# Patient Record
Sex: FEMALE | Race: WHITE | NOT HISPANIC OR LATINO | URBAN - METROPOLITAN AREA
[De-identification: names, ages, dates, MRNs, and addresses within clinical notes are randomized per-mention and may not be internally consistent; named-entity substitution may affect disease eponyms.]

---

## 2018-05-25 ENCOUNTER — TRANSFERRED RECORDS (OUTPATIENT)
Dept: HEALTH INFORMATION MANAGEMENT | Facility: CLINIC | Age: 83
End: 2018-05-25

## 2018-06-18 DIAGNOSIS — H40.1490 PXG (PSEUDOEXFOLIATION GLAUCOMA): Primary | ICD-10-CM

## 2018-06-19 ENCOUNTER — OFFICE VISIT (OUTPATIENT)
Dept: OPHTHALMOLOGY | Facility: CLINIC | Age: 83
End: 2018-06-19
Attending: OPHTHALMOLOGY
Payer: MEDICARE

## 2018-06-19 DIAGNOSIS — H40.1490 PXG (PSEUDOEXFOLIATION GLAUCOMA): ICD-10-CM

## 2018-06-19 DIAGNOSIS — H40.1493 PXG (PSEUDOEXFOLIATION GLAUCOMA), SEVERE STAGE: Primary | ICD-10-CM

## 2018-06-19 DIAGNOSIS — Z96.1 PSEUDOPHAKIA OF BOTH EYES: ICD-10-CM

## 2018-06-19 PROCEDURE — G0463 HOSPITAL OUTPT CLINIC VISIT: HCPCS | Mod: ZF

## 2018-06-19 PROCEDURE — 92083 EXTENDED VISUAL FIELD XM: CPT | Mod: ZF | Performed by: OPHTHALMOLOGY

## 2018-06-19 RX ORDER — LATANOPROST 50 UG/ML
1 SOLUTION/ DROPS OPHTHALMIC AT BEDTIME
Qty: 1 BOTTLE | Refills: 11 | Status: SHIPPED | OUTPATIENT
Start: 2018-06-19 | End: 2018-10-18

## 2018-06-19 RX ORDER — DORZOLAMIDE HYDROCHLORIDE AND TIMOLOL MALEATE 20; 5 MG/ML; MG/ML
1 SOLUTION/ DROPS OPHTHALMIC 2 TIMES DAILY
Qty: 1 BOTTLE | Refills: 11 | Status: SHIPPED | OUTPATIENT
Start: 2018-06-19 | End: 2018-06-21

## 2018-06-19 RX ORDER — ACYCLOVIR 800 MG/1
1 TABLET ORAL 3 TIMES DAILY
COMMUNITY
Start: 2017-10-10

## 2018-06-19 RX ORDER — PREGABALIN 75 MG/1
1 CAPSULE ORAL ONCE
COMMUNITY
Start: 2017-09-15

## 2018-06-19 RX ORDER — BRIMONIDINE TARTRATE 2 MG/ML
1 SOLUTION/ DROPS OPHTHALMIC 3 TIMES DAILY
Qty: 15 ML | Refills: 11 | Status: SHIPPED | OUTPATIENT
Start: 2018-06-19 | End: 2018-06-21

## 2018-06-19 RX ORDER — ERGOCALCIFEROL (VITAMIN D2) 10 MCG
1 TABLET ORAL ONCE
COMMUNITY
Start: 2007-02-26 | End: 2019-03-07

## 2018-06-19 ASSESSMENT — REFRACTION_WEARINGRX
OS_CYLINDER: +0.25
OS_AXIS: 010
OD_SPHERE: BALANCE
OS_SPHERE: -0.25

## 2018-06-19 ASSESSMENT — VISUAL ACUITY
METHOD: SNELLEN - LINEAR
OS_SC: 20/25
OS_SC+: -1
METHOD_MR: PT DECLINED MR TODAY
OD_SC: 20/400

## 2018-06-19 ASSESSMENT — CONF VISUAL FIELD
OD_SUPERIOR_TEMPORAL_RESTRICTION: 1
OS_NORMAL: 1
OD_INFERIOR_NASAL_RESTRICTION: 1
METHOD: COUNTING FINGERS
OD_INFERIOR_TEMPORAL_RESTRICTION: 1
OD_SUPERIOR_NASAL_RESTRICTION: 1

## 2018-06-19 ASSESSMENT — TONOMETRY
IOP_METHOD: APPLANATION
OS_IOP_MMHG: 34
OD_IOP_MMHG: 37

## 2018-06-19 ASSESSMENT — SLIT LAMP EXAM - LIDS
COMMENTS: NORMAL
COMMENTS: NORMAL

## 2018-06-19 ASSESSMENT — EXTERNAL EXAM - LEFT EYE: OS_EXAM: NORMAL

## 2018-06-19 NOTE — PROGRESS NOTES
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 An at [facility]  3)PCIOL OU -- following with Dr. Dudley at [facility]  4)H/O HZO OD with post-herpetic neuralgia -- on Acyclovir and Gabapentin  5)H/O Hemiretinal Vein Occlusion OD per old notes    MD:Drew Orlando (son-in-law) accompanies to most appointments     MD: IOP OS elevated at [facility] with minimal effect after starting Neptazane -- pt referred by Dr. Interiano for consideration of mCPC prior to tube surgery OS    Patient will continue on Latanoprost which is a teal top drop at bedtime in BOTH EYES, Cosopt (Timolol/Dorzolamide) which is a blue top drop 2x/day (12 hours apart) in BOTH EYES, and Alphagan (Brimonidine) which is a purple top drop 2x/day (12 hours apart) in BOTH EYES.  Patient will return to clinic in 2-3 weeks with repeat IOP check, dilated eye exam and OCT with RNFL analysis.    Patient will discontinue and hold onto previous eye drops and bring them with her to her next follow up appointment.    A long discussion of the risks, benefits, and alternatives including potential treatment and management options were had with patient and a decision was made to recheck eye pressures prior to proceeding with Micropusle CPC (laser) versus tube surgery.       Attending Physician Attestation:  Complete documentation of historical and exam elements from today's encounter can be found in the full encounter summary report (not  reduplicated in this progress note). I personally obtained the chief complaint(s) and history of present illness.  I confirmed and edited as necessary the review of systems, past medical/surgical history, family history, social history, and examination findings as documented by others; and I examined the patient myself. I personally reviewed the relevant tests, images, and reports as documented above. I formulated and edited as necessary the assessment and plan and discussed the findings and management plan with the patient and family.  - Radha Morales MD

## 2018-06-19 NOTE — MR AVS SNAPSHOT
After Visit Summary   6/19/2018    Kenia Edwards    MRN: [id number]           Patient Information     Date Of Birth          10/20/1931        Visit Information        Provider Department      6/19/2018 1:45 PM Radha Morales MD [facility]        Today's Diagnoses     Pxg (pseudoexfoliation glaucoma), severe stage    -  1    PXG (pseudoexfoliation glaucoma)        Pseudophakia of both eyes          Care Instructions    Patient will continue on Latanoprost which is a teal top drop at bedtime in BOTH EYES, Cosopt (Timolol/Dorzolamide) which is a blue top drop 2x/day (12 hours apart) in BOTH EYES, and Alphagan (Brimonidine) which is a purple top drop 2x/day (12 hours apart) in BOTH EYES.  Patient will return to clinic in 2-3 weeks with repeat IOP check, dilated eye exam and OCT with RNFL analysis.    Patient discontinue and hold onto previous eye drops and bring them with her to her next follow up appointment.    A long discussion of the risks, benefits, and alternatives including potential treatment and management options were had with patient and a decision was made to recheck eye pressures prior to proceeding with Micropusle CPC (laser) versus tube surgery.           Follow-ups after your visit        Follow-up notes from your care team     Return  2-3 weeks with repeat IOP check, dilated eye exam and OCT with RNFL analysis..      Your next 10 appointments already scheduled     Jul 12, 2018  7:45 AM CDT   RETURN GLAUCOMA with Radha Morales MD   Eye Clinic ([facility])    [address] [facility] [address] [place] [id number]   [phone]              Who to contact     Please call your clinic at [phone] to:    Ask questions about your health    Make or cancel appointments    Discuss your medicines    Learn about your test results    Speak to your doctor            Additional Information About Your Visit        MyChart Information      [employer] gives you secure access to your electronic health record. If you see a primary care provider, you can also send messages to your care team and make appointments. If you have questions, please call your primary care clinic.  If you do not have a primary care provider, please call [phone] and they will assist you.      [employer] is an electronic gateway that provides easy, online access to your medical records. With [employer], you can request a clinic appointment, read your test results, renew a prescription or communicate with your care team.     To access your existing account, please contact your [facility] or call [phone] for assistance.        Care EveryWhere ID     This is your Care EveryWhere ID. This could be used by other organizations to access your [place] medical records  [id number]         Blood Pressure from Last 3 Encounters:   No data found for BP    Weight from Last 3 Encounters:   No data found for Wt              We Performed the Following     OVF 24-2 Dynamic OS          Today's Medication Changes          These changes are accurate as of 6/19/18  4:49 PM.  If you have any questions, ask your nurse or doctor.               Start taking these medicines.        Dose/Directions    brimonidine 0.2 % ophthalmic solution   Commonly known as:  ALPHAGAN   Used for:  Pxg (pseudoexfoliation glaucoma), severe stage   Started by:  Radha Morales MD        Dose:  1 drop   Place 1 drop into both eyes 3 times daily   Quantity:  15 mL   Refills:  11       dorzolamide-timolol 2-0.5 % ophthalmic solution   Commonly known as:  COSOPT   Used for:  Pxg (pseudoexfoliation glaucoma), severe stage   Started by:  Radha Morales MD        Dose:  1 drop   Place 1 drop into both eyes 2 times daily   Quantity:  1 Bottle   Refills:  11         These medicines have changed or have updated prescriptions.        Dose/Directions    * latanoprost 0.005 % ophthalmic solution    Commonly known as:  XALATAN   This may have changed:  Another medication with the same name was added. Make sure you understand how and when to take each.   Used for:  Glaucoma suspect, bilateral   Changed by:  Radha Morales MD        Dose:  1 drop   1 drop At Bedtime   Refills:  0       * latanoprost 0.005 % ophthalmic solution   Commonly known as:  XALATAN   This may have changed:  You were already taking a medication with the same name, and this prescription was added. Make sure you understand how and when to take each.   Used for:  Pxg (pseudoexfoliation glaucoma), severe stage   Changed by:  Radha Morales MD        Dose:  1 drop   Place 1 drop into both eyes At Bedtime   Quantity:  1 Bottle   Refills:  11       * Notice:  This list has 2 medication(s) that are the same as other medications prescribed for you. Read the directions carefully, and ask your doctor or other care provider to review them with you.         Where to get your medicines      Some of these will need a paper prescription and others can be bought over the counter.  Ask your nurse if you have questions.     Bring a paper prescription for each of these medications     brimonidine 0.2 % ophthalmic solution    dorzolamide-timolol 2-0.5 % ophthalmic solution    latanoprost 0.005 % ophthalmic solution                Primary Care Provider Office Phone # Fax #    Richa Dubois [phone]       [facility] [place] [address] [place] [address]        Equal Access to Services     JANET KEARNS AH: Hadii monse ku hadasho Soerinali, waaxda luqadaha, qaybta kaalmada adeegyada, nichole ma. So [facility] [phone].    ATENCIÓN: Si rala jaylinañol, tiene a hodges disposición servicios gratuitos de asistencia lingüística. Llame al [phone].    We comply with applicable federal civil rights laws and [place] laws. We do not discriminate on the basis of race, color,  national origin, age, disability, sex, sexual orientation, or gender identity.            Thank you!     Thank you for choosing EYE CLINIC  for your care. Our goal is always to provide you with excellent care. Hearing back from our patients is one way we can continue to improve our services. Please take a few minutes to complete the written survey that you may receive in the mail after your visit with us. Thank you!             Your Updated Medication List - Protect others around you: Learn how to safely use, store and throw away your medicines at www.disposemymeds.org.          This list is accurate as of 6/19/18  4:49 PM.  Always use your most recent med list.                   Brand Name Dispense Instructions for use Diagnosis    acyclovir 800 MG tablet    ZOVIRAX     Take 1 tablet by mouth 4 times daily        albuterol 108 (90 Base) MCG/ACT Inhaler    PROAIR HFA/PROVENTIL HFA/VENTOLIN HFA     Inhale 2 puffs into the lungs daily    Glaucoma suspect, bilateral       ascorbic acid 500 MG Tabs           brimonidine 0.2 % ophthalmic solution    ALPHAGAN    15 mL    Place 1 drop into both eyes 3 times daily    Pxg (pseudoexfoliation glaucoma), severe stage       dorzolamide-timolol 2-0.5 % ophthalmic solution    COSOPT    1 Bottle    Place 1 drop into both eyes 2 times daily    Pxg (pseudoexfoliation glaucoma), severe stage       * Ergocalciferol 400 units Tabs           * Vitamin D2 400 units Tabs      Take 1 tablet by mouth once        * latanoprost 0.005 % ophthalmic solution    XALATAN     1 drop At Bedtime    Glaucoma suspect, bilateral       * latanoprost 0.005 % ophthalmic solution    XALATAN    1 Bottle    Place 1 drop into both eyes At Bedtime    Pxg (pseudoexfoliation glaucoma), severe stage       Latanoprostene Bunod 0.024 % Soln      Place 1 drop into both eyes At Bedtime        * PREGABALIN PO      Take 1 capsule by mouth 2 times daily    Glaucoma suspect, bilateral       * LYRICA 75 MG capsule   Generic  drug:  pregabalin      Take 1 tablet by mouth once        timolol 0.5 % ophthalmic solution    TIMOPTIC     1 drop every morning    Glaucoma suspect, bilateral       * Notice:  This list has 6 medication(s) that are the same as other medications prescribed for you. Read the directions carefully, and ask your doctor or other care provider to review them with you.

## 2018-06-19 NOTE — NURSING NOTE
Chief Complaints and History of Present Illnesses   Patient presents with     Consult For     CPC laser LE, referred by Dr. Interiano at [facility]    Affected eye(s):  Both   Symptoms:           Do you have eye pain now?:  No      Comments:  RE vision about the same no changes. Pt states LE vision is ok, but IOP high x 4 weeks. Per Dr. Interiano have pt do CPC laser to lower IOP in LE. Always pain in RE x years.     Grace Prakash@ [facility] 2:01 PM June 19, 2018

## 2018-06-19 NOTE — PATIENT INSTRUCTIONS
Patient will continue on Latanoprost which is a teal top drop at bedtime in BOTH EYES, Cosopt (Timolol/Dorzolamide) which is a blue top drop 2x/day (12 hours apart) in BOTH EYES, and Alphagan (Brimonidine) which is a purple top drop 2x/day (12 hours apart) in BOTH EYES.  Patient will return to clinic in 2-3 weeks with repeat IOP check, dilated eye exam and OCT with RNFL analysis.    Patient discontinue and hold onto previous eye drops and bring them with her to her next follow up appointment.    A long discussion of the risks, benefits, and alternatives including potential treatment and management options were had with patient and a decision was made to recheck eye pressures prior to proceeding with Micropusle CPC (laser) versus tube surgery.

## 2018-06-20 ENCOUNTER — TELEPHONE (OUTPATIENT)
Dept: OPHTHALMOLOGY | Facility: CLINIC | Age: 83
End: 2018-06-20

## 2018-06-20 NOTE — TELEPHONE ENCOUNTER
Health Call Center    Phone Message    May a detailed message be left on voicemail: yes    Reason for Call: Other: The pt's daughter Jony Orlando is calling to discuss the allergies the pt has to 2 of the RX prescribed by Dr Carmen osuna. Jony spoke to the pt who doesn't remember any emergency type reactions to these meds. The pt is allergic to Sulfa, can't take it orally, but has had drops in the past with Sulfa. The pt is allergic to Alphagan - causes itching. Please call Jony to discuss if there are alternatives that will be prescribed. Thanks!      Action Taken: Message routed to:  [facility] ben eye gen

## 2018-06-20 NOTE — TELEPHONE ENCOUNTER
[facility] Call Center    Phone Message    May a detailed message be left on voicemail: no    Reason for Call: Other: Candida, from [facility] called, and stated that 2 prescriptions sent over by Dr. Morales were flagged for pt having allergies to them. Pt has allergies to Sulfa (Cosopt) (causes anaphalaxis) and Brimonidine (Alphagan) (causes rash, and itching) Candida stated this allergy was listed w/ them @ [facility], and that she spoke w/ the pt who stated she had told us, and wanted to know why we did not have this information already. Candida is requesting we contact the pt to update her medication allergies at [phone], and to discuss what should be done next.     Action Taken: Message routed to:  [facility] Eye

## 2018-06-20 NOTE — TELEPHONE ENCOUNTER
Reviewed with dr. Rosas and called pt back this afternoon    Pt states been using timolol by itself-- not timolol/dorzolamide    Sulfa allergy    Reviewed with dr. Rosas  Will keep drops the same timolol and latanoprost    Pt has appt July 12 th for IOP check, will review drops at appt   Yaron Adams RN 5:02 PM 06/20/18

## 2018-06-20 NOTE — TELEPHONE ENCOUNTER
Spoke to pt this AM   Pt has been using dorzolamide/timolol for long period with no issues    Previous reaction to alphagan in past per pt and has not been using    Pt using latanoprost (pt states started sample of new eye drop as alternative) but no reaction to latanoprost    Please review note-- pharmacy concerns of reactions  Yaron Adams RN 4:21 PM 06/20/18

## 2018-06-21 NOTE — TELEPHONE ENCOUNTER
Reviewed with pharmacy and pharmacy aware will only use timolol and latanoprost for now  Will review further next visit    Updated meds in epic    Will need to reorder if would like to proceed with new drops  Yaron Adams RN 9:12 AM 06/21/18

## 2018-07-12 ENCOUNTER — OFFICE VISIT (OUTPATIENT)
Dept: OPHTHALMOLOGY | Facility: CLINIC | Age: 83
End: 2018-07-12
Attending: OPHTHALMOLOGY
Payer: MEDICARE

## 2018-07-12 DIAGNOSIS — Z96.1 PSEUDOPHAKIA OF BOTH EYES: ICD-10-CM

## 2018-07-12 DIAGNOSIS — H40.1493 PXG (PSEUDOEXFOLIATION GLAUCOMA), SEVERE STAGE: Primary | ICD-10-CM

## 2018-07-12 PROCEDURE — G0463 HOSPITAL OUTPT CLINIC VISIT: HCPCS | Mod: ZF

## 2018-07-12 ASSESSMENT — TONOMETRY
IOP_METHOD: TONOPEN
IOP_METHOD: APPLANATION
OS_IOP_MMHG: 36
OS_IOP_MMHG: 42
OD_IOP_MMHG: 26
IOP_METHOD: APPLANATION
OS_IOP_MMHG: 41
OD_IOP_MMHG: 23
OS_IOP_MMHG: 26
IOP_METHOD: APPLANATION
OD_IOP_MMHG: 24

## 2018-07-12 ASSESSMENT — CONF VISUAL FIELD
OD_INFERIOR_TEMPORAL_RESTRICTION: 3
OD_INFERIOR_NASAL_RESTRICTION: 3
OD_SUPERIOR_NASAL_RESTRICTION: 1
OS_NORMAL: 1

## 2018-07-12 ASSESSMENT — VISUAL ACUITY
OS_SC: 20/25
OD_SC: E @ 3'
METHOD: SNELLEN - LINEAR

## 2018-07-12 ASSESSMENT — CUP TO DISC RATIO
OS_RATIO: 0.8
OD_RATIO: 0.9

## 2018-07-12 ASSESSMENT — EXTERNAL EXAM - LEFT EYE: OS_EXAM: NORMAL

## 2018-07-12 ASSESSMENT — SLIT LAMP EXAM - LIDS
COMMENTS: NORMAL
COMMENTS: NORMAL

## 2018-07-12 NOTE — NURSING NOTE
Chief Complaints and History of Present Illnesses   Patient presents with     Follow Up For     Pxg (pseudoexfoliation glaucoma), severe stage (Primary Dx);...     HPI    Affected eye(s):  Both   Symptoms:     No blurred vision   No decreased vision   No floaters   No flashes   Dryness   Photophobia      Duration:  3 weeks   Frequency:  Constant       Do you have eye pain now?:  No      Comments:   States va is the same since last visit    Latanoprost BOTH EYES last used 8:00 PM  Timolol OU last used 5:00 AM  AT's 2 X daily  Gel nightly OU  Tom Cox  7:49 AM July 12, 2018

## 2018-07-12 NOTE — MR AVS SNAPSHOT
After Visit Summary   7/12/2018    Kenia Edwards    MRN: [id number]           Patient Information     Date Of Birth          10/20/1931        Visit Information        Provider Department      7/12/2018 7:45 AM Radha Morales MD [facility]        Today's Diagnoses     Pxg (pseudoexfoliation glaucoma), severe stage    -  1    Pseudophakia of both eyes          Care Instructions    Patient will continue on Latanoprost which is a teal top drop at bedtime in BOTH EYES, Timolol which is a yellow top drop in the morning in BOTH EYES.  A long discussion of the risks, benefits, and alternatives including potential treatment and management options were had with patient and a decision was made to retrial drops prior to proceeding with mCPC (laser) in the right eye and tube surgery in the left eye.     Patient will call if interested in trying a less invasive surgery prior to tube surgery in the left eye.            Follow-ups after your visit        Follow-up notes from your care team     Return in about 1 month (around 8/12/2018).      Who to contact     Please call your clinic at [phone] to:    Ask questions about your health    Make or cancel appointments    Discuss your medicines    Learn about your test results    Speak to your doctor            Additional Information About Your Visit        [employer] Information     [employer] gives you secure access to your electronic health record. If you see a primary care provider, you can also send messages to your care team and make appointments. If you have questions, please call your primary care clinic.  If you do not have a primary care provider, please call [phone] and they will assist you.      [employer] is an electronic gateway that provides easy, online access to your medical records. With [employer], you can request a clinic appointment, read your test results, renew a prescription or communicate with your care team.     To access your existing  account, please contact your [facility] or call [phone] for assistance.        Care EveryWhere ID     This is your Care EveryWhere ID. This could be used by other organizations to access your [place] medical records  [id number]         Blood Pressure from Last 3 Encounters:   No data found for BP    Weight from Last 3 Encounters:   No data found for Wt              Today, you had the following     No orders found for display       Primary Care Provider Office Phone # Fax #    Richacarlie Dubois [phone]       [facility] [place] [address] [place] [address]        Equal Access to Services     ANTONIETTA KEARNS : Hadii aad ku hadasho Soomaali, waaxda luqadaha, qaybta kaalmada adeegyada, waxay idiin hayaan adeeg sharon juarez . So [facility] [phone].    ATENCIÓN: Si habla español, tiene a hodges disposición servicios gratuitos de asistencia lingüística. [facility] [phone].    We comply with applicable federal civil rights laws and [place] laws. We do not discriminate on the basis of race, color, national origin, age, disability, sex, sexual orientation, or gender identity.            Thank you!     Thank you for choosing [facility]  for your care. Our goal is always to provide you with excellent care. Hearing back from our patients is one way we can continue to improve our services. Please take a few minutes to complete the written survey that you may receive in the mail after your visit with us. Thank you!             Your Updated Medication List - Protect others around you: Learn how to safely use, store and throw away your medicines at www.disposemymeds.org.          This list is accurate as of 7/12/18 10:23 AM.  Always use your most recent med list.                   Brand Name Dispense Instructions for use Diagnosis    acyclovir 800 MG tablet    ZOVIRAX     Take 1 tablet by mouth 4 times daily        albuterol 108 (90 Base)  MCG/ACT Inhaler    PROAIR HFA/PROVENTIL HFA/VENTOLIN HFA     Inhale 2 puffs into the lungs daily    Glaucoma suspect, bilateral       ascorbic acid 500 MG Tabs           * Ergocalciferol 400 units Tabs           * Vitamin D2 400 units Tabs      Take 1 tablet by mouth once        latanoprost 0.005 % ophthalmic solution    XALATAN    1 Bottle    Place 1 drop into both eyes At Bedtime    Pxg (pseudoexfoliation glaucoma), severe stage       * PREGABALIN PO      Take 1 capsule by mouth 2 times daily    Glaucoma suspect, bilateral       * LYRICA 75 MG capsule   Generic drug:  pregabalin      Take 1 tablet by mouth once        timolol 0.5 % ophthalmic solution    TIMOPTIC     1 drop every morning    Glaucoma suspect, bilateral       * Notice:  This list has 4 medication(s) that are the same as other medications prescribed for you. Read the directions carefully, and ask your doctor or other care provider to review them with you.

## 2018-07-12 NOTE — PATIENT INSTRUCTIONS
Patient will continue on Latanoprost which is a teal top drop at bedtime in BOTH EYES, Timolol which is a yellow top drop in the morning in BOTH EYES.  A long discussion of the risks, benefits, and alternatives including potential treatment and management options were had with patient and a decision was made to retrial drops prior to proceeding with mCPC (laser) in the right eye and tube surgery in the left eye.     Patient will call if interested in trying a less invasive surgery prior to tube surgery in the left eye.

## 2018-07-12 NOTE — PROGRESS NOTES
1)PXG -- s/p TSCPC OD (9/2/16), s/p Tube OD x2 in past with removal in 2016, ?Trab OU given exam findings --  K pachy:566/583    Tmax: 38/27 (OS:42 on 7/18)   HVF: OD:dec MD with :Inf arcuate with SN dec  (poor reliability) and OS:Fluct    CDR:0.9/0.8     HRT/OCT: OD:Mild RNFl thinning (poor tracing) and OS:Mod RNFl thinning       FHX of Glc: No     Gonio:       Intolerant to: Methazolamide -- loss weight, decreased appetite, AGN -- redness and itching (per notes from 10+ yrs ago), Cosopt -- redness and itching per old notes from 10+ years ago (likely MITZY as she is tolerating T1/2)    Asthma/COPD: Yes, on inhalers but tolerating topical BB  Steroid Use: Yes, was on topical pred     Kidney Stones:  No   Sulfa Allergy: Yes, hives     IOP targets: -- IOP above target OU -- consider mCPC vs Tube   2)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 An at [facility]  3)PCIOL OU -- following with Dr. Dudley and Dr. Interiano at [facility]  4)H/O HZO OD with post-herpetic neuralgia -- on Acyclovir and Gabapentin  5)H/O Hemiretinal Vein Occlusion OD per old notes  6)H/O K Ulcer OD per old notes -- followed by Dr. Gege ESCOBAR:Drew Nugentr (son-in-law) accompanies to most appointments -- Jony Orlando (daughter is RN)    MD: pt referred by Dr. Interiano for consideration of mCPC prior to tube surgery OS -- discussed possible options including Rhopressa, GATT/KDB, XEN, mCPC, Tube     MD: Patient never tried Cosopt or AGN given a reported H/O allergy from 10+ years ago    Patient will continue on Latanoprost which is a teal top drop at bedtime in BOTH EYES, Timolol which is a yellow top drop in the morning in BOTH EYES.  A long discussion of the risks, benefits, and alternatives including potential treatment and management options were had with patient and a decision was made to retrial drops prior to proceeding with mCPC (laser) in the right eye and tube surgery in the left eye.      Patient will call if interested in trying a less invasive surgery prior to tube surgery in the left eye.          Resident Note:  Here for IOP check  On Latanoprost at bedtime, on Timolol in the AM (allergic to Dorzolamide), also allergic to Alphagan  IOP today 24/36  Given intolerance to drops and elevated pressure, patient could benefit from mCPC or tube surgery    Chip Mendez MD  PGY-3 Ophthalmology Resident  [phone]    I spent a total of 25 minutes with the patient of which greater than 50% of this time was spent counseling treatment and management decisions.        Attending Physician Attestation:  Complete documentation of historical and exam elements from today's encounter can be found in the full encounter summary report (not reduplicated in this progress note). I personally obtained the chief complaint(s) and history of present illness.  I confirmed and edited as necessary the review of systems, past medical/surgical history, family history, social history, and examination findings as documented by others; and I examined the patient myself. I personally reviewed the relevant tests, images, and reports as documented above. I formulated and edited as necessary the assessment and plan and discussed the findings and management plan with the patient and family.  - Radha Morales MD

## 2018-08-09 ENCOUNTER — OFFICE VISIT (OUTPATIENT)
Dept: OPHTHALMOLOGY | Facility: CLINIC | Age: 83
End: 2018-08-09
Attending: OPHTHALMOLOGY
Payer: MEDICARE

## 2018-08-09 DIAGNOSIS — H40.1493 PXG (PSEUDOEXFOLIATION GLAUCOMA), SEVERE STAGE: Primary | ICD-10-CM

## 2018-08-09 DIAGNOSIS — Z96.1 PSEUDOPHAKIA OF BOTH EYES: ICD-10-CM

## 2018-08-09 PROCEDURE — G0463 HOSPITAL OUTPT CLINIC VISIT: HCPCS | Mod: ZF

## 2018-08-09 RX ORDER — DORZOLAMIDE HYDROCHLORIDE AND TIMOLOL MALEATE 20; 5 MG/ML; MG/ML
SOLUTION/ DROPS OPHTHALMIC
COMMUNITY
Start: 2018-07-12 | End: 2018-10-18

## 2018-08-09 RX ORDER — BRIMONIDINE TARTRATE 2 MG/ML
SOLUTION/ DROPS OPHTHALMIC
COMMUNITY
Start: 2018-07-12 | End: 2018-10-18

## 2018-08-09 ASSESSMENT — TONOMETRY
OS_IOP_MMHG: 28
OD_IOP_MMHG: 20
IOP_METHOD: APPLANATION

## 2018-08-09 ASSESSMENT — VISUAL ACUITY
OD_SC: CF @ 3'
OS_SC: 20/25
OS_SC+: -1
METHOD: SNELLEN - LINEAR

## 2018-08-09 ASSESSMENT — EXTERNAL EXAM - LEFT EYE: OS_EXAM: NORMAL

## 2018-08-09 ASSESSMENT — CONF VISUAL FIELD
OD_INFERIOR_NASAL_RESTRICTION: 1
OD_SUPERIOR_TEMPORAL_RESTRICTION: 1
OS_NORMAL: 1
OD_SUPERIOR_NASAL_RESTRICTION: 1
OD_INFERIOR_TEMPORAL_RESTRICTION: 1
METHOD: COUNTING FINGERS

## 2018-08-09 ASSESSMENT — SLIT LAMP EXAM - LIDS
COMMENTS: NORMAL
COMMENTS: NORMAL

## 2018-08-09 NOTE — PROGRESS NOTES
1)PXG -- s/p TSCPC OD (9/2/16), s/p Tube OD x2 in past with removal in 2016, ?Trab OU given exam findings --  K pachy:566/583    Tmax: 38/27 (OS:42 on 7/18)   HVF: OD:dec MD with :Inf arcuate with SN dec  (poor reliability) and OS:Fluct    CDR:0.9/0.8     HRT/OCT: OD:Mild RNFl thinning (poor tracing) and OS:Mod RNFl thinning       FHX of Glc: No     Gonio:       Intolerant to: Methazolamide -- loss weight, decreased appetite, AGN -- redness and itching (per notes from 10+ yrs ago), Cosopt -- redness and itching per old notes from 10+ years ago (likely MITZY as she is tolerating T1/2)    Asthma/COPD: Yes, on inhalers but tolerating topical BB  Steroid Use: Yes, was on topical pred     Kidney Stones:  No   Sulfa Allergy: Yes, hives     IOP targets: -- IOP above target OS -- consider mCPC vs Tube   2)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 An at [facility]  3)PCIOL OU -- following with Dr. Dudley and Dr. Interiano at [facility]  4)H/O HZO OD with post-herpetic neuralgia -- on Acyclovir and Gabapentin  5)H/O Hemiretinal Vein Occlusion OD per old notes  6)H/O K Ulcer OD per old notes -- followed by Dr. Gege ESCOBAR:Drew Nugentr (son-in-law) accompanies to most appointments -- Jony Orlando (daughter is RN)    MD: pt referred by Dr. Interiano for consideration of mCPC prior to tube surgery OS -- discussed possible options including Rhopressa, GATT/KDB, XEN, mCPC, Tube     MD: Patient never tried Cosopt or AGN given a reported H/O allergy from 10+ years ago -- retried (8/18) without any SE or issues per patient     Patient will continue on Latanoprost which is a teal top drop at bedtime in BOTH EYES, Cosopt (Timolol/Dorzolamide) which is a blue top drop 2x/day (12 hours apart) in BOTH EYES, Alphagan (Brimonidine) which is a purple top drop 2x/day (12 hours apart) in BOTH EYES.      A long discussion of the risks, benefits, and alternatives including potential  treatment and management options were had with patient and a decision was made to consider proceeding with mCPC (laser) or tube surgery in the left eye.     Patient will call if interested in trying a less invasive surgery prior to tube surgery in the left eye.          Attending Physician Attestation:  Complete documentation of historical and exam elements from today's encounter can be found in the full encounter summary report (not reduplicated in this progress note). I personally obtained the chief complaint(s) and history of present illness.  I confirmed and edited as necessary the review of systems, past medical/surgical history, family history, social history, and examination findings as documented by others; and I examined the patient myself. I personally reviewed the relevant tests, images, and reports as documented above. I formulated and edited as necessary the assessment and plan and discussed the findings and management plan with the patient and family.  - Radha Morales MD

## 2018-08-09 NOTE — PATIENT INSTRUCTIONS
Patient will continue on Latanoprost which is a teal top drop at bedtime in BOTH EYES, Cosopt (Timolol/Dorzolamide) which is a blue top drop 2x/day (12 hours apart) in BOTH EYES, Alphagan (Brimonidine) which is a purple top drop 2x/day (12 hours apart) in BOTH EYES.      A long discussion of the risks, benefits, and alternatives including potential treatment and management options were had with patient and a decision was made to consider proceeding with mCPC (laser) or tube surgery in the left eye.

## 2018-08-09 NOTE — MR AVS SNAPSHOT
After Visit Summary   8/9/2018    Kenia Edwards    MRN: [id number]           Patient Information     Date Of Birth          10/20/1931        Visit Information        Provider Department      8/9/2018 1:00 PM Radha Morales MD [facility]        Today's Diagnoses     Pxg (pseudoexfoliation glaucoma), severe stage    -  1    Pseudophakia of both eyes          Care Instructions    Patient will continue on Latanoprost which is a teal top drop at bedtime in BOTH EYES, Cosopt (Timolol/Dorzolamide) which is a blue top drop 2x/day (12 hours apart) in BOTH EYES, Alphagan (Brimonidine) which is a purple top drop 2x/day (12 hours apart) in BOTH EYES.      A long discussion of the risks, benefits, and alternatives including potential treatment and management options were had with patient and a decision was made to consider proceeding with mCPC (laser) or tube surgery in the left eye.             Follow-ups after your visit        Your next 10 appointments already scheduled     Sep 25, 2018 12:45 PM CDT   Post-Op with Radha Morales MD   Eye Clinic ([facility])    [address] [id number] [phone]              Who to contact     Please call your clinic at [phone] to:    Ask questions about your health    Make or cancel appointments    Discuss your medicines    Learn about your test results    Speak to your doctor            Additional Information About Your Visit        MyChart Information     [employer] gives you secure access to your electronic health record. If you see a primary care provider, you can also send messages to your care team and make appointments. If you have questions, please call your primary care clinic.  If you do not have a primary care provider, please call [phone] and they will assist you.      [employer] is an electronic gateway that provides easy, online access to your medical records. With  [employer], you can request a clinic appointment, read your test results, renew a prescription or communicate with your care team.     To access your existing account, please contact your [facility] or call [phone] for assistance.        Care EveryWhere ID     This is your Care EveryWhere ID. This could be used by other organizations to access your [place] medical records  [id number]         Blood Pressure from Last 3 Encounters:   No data found for BP    Weight from Last 3 Encounters:   No data found for Wt              We Performed the Following     Sissy-Operative Worksheet (Glaucoma)        Primary Care Provider Office Phone # Fax #    Richa Dubois [phone]       [address] [address] [address] [place] [address] [place] [address]        Equal Access to Services     JANET KEARNS : Hadii monse ku hadasho Soomaali, waaxda luqadaha, qaybta kaalmada adeegyada, waxay warrenin hayaan dunia juarez . So [facility] [phone].    ATENCIÓN: Si habla español, tiene a hodges disposición servicios gratuitos de asistencia lingüística. Llame al [phone].    We comply with applicable federal civil rights laws and [place] laws. We do not discriminate on the basis of race, color, national origin, age, disability, sex, sexual orientation, or gender identity.            Thank you!     Thank you for choosing EYE CLINIC  for your care. Our goal is always to provide you with excellent care. Hearing back from our patients is one way we can continue to improve our services. Please take a few minutes to complete the written survey that you may receive in the mail after your visit with us. Thank you!             Your Updated Medication List - Protect others around you: Learn how to safely use, store and throw away your medicines at www.disposemymeds.org.          This list is accurate as of 8/9/18 11:59 PM.  Always use your most recent med list.                    Brand Name Dispense Instructions for use Diagnosis    acyclovir 800 MG tablet    ZOVIRAX     Take 1 tablet by mouth 4 times daily        albuterol 108 (90 Base) MCG/ACT inhaler    PROAIR HFA/PROVENTIL HFA/VENTOLIN HFA     Inhale 2 puffs into the lungs daily    Glaucoma suspect, bilateral       ascorbic acid 500 MG Tabs           brimonidine 0.2 % ophthalmic solution    ALPHAGAN          dorzolamide-timolol 2-0.5 % ophthalmic solution    COSOPT          * Ergocalciferol 400 units Tabs           * Vitamin D2 400 units Tabs      Take 1 tablet by mouth once        latanoprost 0.005 % ophthalmic solution    XALATAN    1 Bottle    Place 1 drop into both eyes At Bedtime    Pxg (pseudoexfoliation glaucoma), severe stage       * PREGABALIN PO      Take 1 capsule by mouth 2 times daily    Glaucoma suspect, bilateral       * LYRICA 75 MG capsule   Generic drug:  pregabalin      Take 1 tablet by mouth once        REFRESH CELLUVISC OP           REFRESH P.M. OP           * Notice:  This list has 4 medication(s) that are the same as other medications prescribed for you. Read the directions carefully, and ask your doctor or other care provider to review them with you.

## 2018-08-09 NOTE — NURSING NOTE
Chief Complaints and History of Present Illnesses   Patient presents with     Glaucoma Follow Up     1 month f/u for PXG      HPI    Affected eye(s):  Both   Symptoms:           Do you have eye pain now?:  No      Comments:  Pt states vision is stable no big changes since last visit. Pt also notes drops are going well and she is tolerating them well.     Grace Prakash@ [facility] 1:03 PM August 9, 2018

## 2018-08-27 ENCOUNTER — TRANSFERRED RECORDS (OUTPATIENT)
Dept: HEALTH INFORMATION MANAGEMENT | Facility: CLINIC | Age: 83
End: 2018-08-27

## 2018-09-25 ENCOUNTER — OFFICE VISIT (OUTPATIENT)
Dept: OPHTHALMOLOGY | Facility: CLINIC | Age: 83
End: 2018-09-25
Attending: OPHTHALMOLOGY
Payer: MEDICARE

## 2018-09-25 DIAGNOSIS — H40.1493 PXG (PSEUDOEXFOLIATION GLAUCOMA), SEVERE STAGE: Primary | ICD-10-CM

## 2018-09-25 PROCEDURE — G0463 HOSPITAL OUTPT CLINIC VISIT: HCPCS | Mod: ZF

## 2018-09-25 ASSESSMENT — CONF VISUAL FIELD
OD_INFERIOR_TEMPORAL_RESTRICTION: 1
OD_SUPERIOR_NASAL_RESTRICTION: 1
OD_SUPERIOR_TEMPORAL_RESTRICTION: 1
OD_INFERIOR_NASAL_RESTRICTION: 1

## 2018-09-25 ASSESSMENT — TONOMETRY
IOP_METHOD: TONOPEN
OD_IOP_MMHG: 18
OD_IOP_MMHG: 20
OS_IOP_MMHG: 32
OS_IOP_MMHG: 22
OD_IOP_MMHG: 22
OS_IOP_MMHG: 30
IOP_METHOD: APPLANATION
IOP_METHOD: APPLANATION

## 2018-09-25 ASSESSMENT — SLIT LAMP EXAM - LIDS
COMMENTS: NORMAL
COMMENTS: NORMAL

## 2018-09-25 ASSESSMENT — EXTERNAL EXAM - LEFT EYE: OS_EXAM: NORMAL

## 2018-09-25 ASSESSMENT — VISUAL ACUITY
OD_SC: 20/400
METHOD: SNELLEN - LINEAR
OS_SC: 20/30

## 2018-09-25 NOTE — MR AVS SNAPSHOT
After Visit Summary   9/25/2018    Kenia Edwards    MRN: [id number]           Patient Information     Date Of Birth          10/20/1931        Visit Information        Provider Department      9/25/2018 12:45 PM Radha Morales MD [facility]        Today's Diagnoses     Pxg (pseudoexfoliation glaucoma), severe stage    -  1      Care Instructions    Patient will continue on Latanoprost which is a teal top drop at bedtime in BOTH EYES, Cosopt (Timolol/Dorzolamide) which is a blue top drop 2x/day (12 hours apart) in BOTH EYES, Alphagan (Brimonidine) which is a purple top drop 2x/day (12 hours apart) in BOTH EYES.      A long discussion of the risks, benefits, and alternatives including potential treatment and management options were had with patient and a decision was made to check IOP prior to considering additional mCPC (laser) or tube surgery in the left eye.     Patient will return to clinic in 2-4 weeks with repeat IOP check.            Follow-ups after your visit        Follow-up notes from your care team     Return 2-4 weeks with repeat IOP check..      Your next 10 appointments already scheduled     Oct 18, 2018 12:15 PM CDT   RETURN GLAUCOMA with Radha Morales MD   Eye Clinic ([facility])    [place] [address]   [facility] [address] [place] [id number]   [phone]            Oct 23, 2018  1:30 PM CDT   Post-Op with Radha Morales MD   Eye Clinic ([facility])    [place] [address]   [facility] [address] [place] [address]   [phone]            Nov 01, 2018  1:30 PM CDT   Post-Op with Radha Morales MD   Eye Clinic ([facility])    [place] [address]   [facility] [address] [place] [address]   [phone]              Who to contact     Please call your clinic at [phone] to:    Ask questions about your health    Make or cancel appointments    Discuss your  medicines    Learn about your test results    Speak to your doctor            Additional Information About Your Visit        MyChart Information     [employer] gives you secure access to your electronic health record. If you see a primary care provider, you can also send messages to your care team and make appointments. If you have questions, please call your primary care clinic.  If you do not have a primary care provider, please call [phone] and they will assist you.      [employer] is an electronic gateway that provides easy, online access to your medical records. With [employer], you can request a clinic appointment, read your test results, renew a prescription or communicate with your care team.     To access your existing account, please contact your [facility] or call [phone] for assistance.        Care EveryWhere ID     This is your Care EveryWhere ID. This could be used by other organizations to access your [place] medical records  [id number]         Blood Pressure from Last 3 Encounters:   No data found for BP    Weight from Last 3 Encounters:   No data found for Wt              We Performed the Following     Sissy-Operative Worksheet (Glaucoma)        Primary Care Provider Office Phone # Fax #    Richa Dubois [phone]       [facility] [place] [address] [place] [address]        Equal Access to Services     JANET KEARNS : Hadii aad ku hadasho Soomaali, waaxda luqadaha, qaybta kaalmada adeegyada, waxay idiin hayalexein dunia kharaoscar la'cristiano ma. So [facility] [phone].    ATENCIÓN: Si habla español, tiene a hodges disposición servicios gratuitos de asistencia lingüística. Llame al [phone].    We comply with applicable federal civil rights laws and [place] laws. We do not discriminate on the basis of race, color, national origin, age, disability, sex, sexual orientation, or gender identity.            Thank you!     Thank  you for choosing EYE CLINIC  for your care. Our goal is always to provide you with excellent care. Hearing back from our patients is one way we can continue to improve our services. Please take a few minutes to complete the written survey that you may receive in the mail after your visit with us. Thank you!             Your Updated Medication List - Protect others around you: Learn how to safely use, store and throw away your medicines at www.disposemymeds.org.          This list is accurate as of 9/25/18  2:26 PM.  Always use your most recent med list.                   Brand Name Dispense Instructions for use Diagnosis    acyclovir 800 MG tablet    ZOVIRAX     Take 1 tablet by mouth 4 times daily        albuterol 108 (90 Base) MCG/ACT inhaler    PROAIR HFA/PROVENTIL HFA/VENTOLIN HFA     Inhale 2 puffs into the lungs daily    Glaucoma suspect, bilateral       ascorbic acid 500 MG Tabs           brimonidine 0.2 % ophthalmic solution    ALPHAGAN          dorzolamide-timolol 2-0.5 % ophthalmic solution    COSOPT          * Ergocalciferol 400 units Tabs           * Vitamin D2 400 units Tabs      Take 1 tablet by mouth once        latanoprost 0.005 % ophthalmic solution    XALATAN    1 Bottle    Place 1 drop into both eyes At Bedtime    Pxg (pseudoexfoliation glaucoma), severe stage       * PREGABALIN PO      Take 1 capsule by mouth 2 times daily    Glaucoma suspect, bilateral       * LYRICA 75 MG capsule   Generic drug:  pregabalin      Take 1 tablet by mouth once        REFRESH CELLUVISC OP           REFRESH P.M. OP           * Notice:  This list has 4 medication(s) that are the same as other medications prescribed for you. Read the directions carefully, and ask your doctor or other care provider to review them with you.

## 2018-09-25 NOTE — PROGRESS NOTES
1)PXG -- s/p mCPC OS (8/27/18),  s/p TSCPC OD (9/2/16), s/p Tube OD x2 in past with removal in 2016, ?Trab OU given exam findings --  K pachy:566/583    Tmax: 38/27 (OS:42 on 7/18)   HVF: OD:dec MD with :Inf arcuate with SN dec  (poor reliability) and OS:Fluct    CDR:0.9/0.8     HRT/OCT: OD:Mild RNFl thinning (poor tracing) and OS:Mod RNFl thinning       FHX of Glc: No     Gonio:       Intolerant to: Methazolamide -- loss weight, decreased appetite, AGN -- redness and itching (per notes from 10+ yrs ago), Cosopt -- redness and itching per old notes from 10+ years ago (likely MITZY as she is tolerating T1/2)    Asthma/COPD: Yes, on inhalers but tolerating topical BB  Steroid Use: Yes, was on topical pred     Kidney Stones:  No   Sulfa Allergy: Yes, hives     IOP targets: -- IOP above target OS -- consider mCPC vs Tube   2)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 An at [facility]  3)PCIOL OU -- following with Dr. Dudley and Dr. Interiano at [facility]  4)H/O HZO OD with post-herpetic neuralgia -- on Acyclovir and Gabapentin  5)H/O Hemiretinal Vein Occlusion OD per old notes  6)H/O K Ulcer OD per old notes -- followed by Dr. De Guzman    MD:Drew Orlando (son-in-law) accompanies to most appointments -- Jony Orlando (daughter is RN)    MD: pt referred by Dr. Interiano for consideration of mCPC prior to tube surgery OS -- discussed possible options including Rhopressa, GATT/KDB, XEN, mCPC, Tube     MD: Patient never tried Cosopt or AGN given a reported H/O allergy from 10+ years ago -- retried (8/18) without any SE or issues per patient     Patient will continue on Latanoprost which is a teal top drop at bedtime in BOTH EYES, Cosopt (Timolol/Dorzolamide) which is a blue top drop 2x/day (12 hours apart) in BOTH EYES, Alphagan (Brimonidine) which is a purple top drop 2x/day (12 hours apart) in BOTH EYES.      A long discussion of the risks, benefits, and alternatives  including potential treatment and management options were had with patient and a decision was made to check IOP prior to considering additional mCPC (laser) or tube surgery in the left eye.     Patient will return to clinic in 2-4 weeks with repeat IOP check.        Attending Physician Attestation:  Complete documentation of historical and exam elements from today's encounter can be found in the full encounter summary report (not reduplicated in this progress note). I personally obtained the chief complaint(s) and history of present illness.  I confirmed and edited as necessary the review of systems, past medical/surgical history, family history, social history, and examination findings as documented by others; and I examined the patient myself. I personally reviewed the relevant tests, images, and reports as documented above. I formulated and edited as necessary the assessment and plan and discussed the findings and management plan with the patient and family.  - Radha Morales MD

## 2018-09-25 NOTE — NURSING NOTE
Chief Complaints and History of Present Illnesses   Patient presents with     Post Op (Ophthalmology) Left Eye     4 week post op LE CPC     HPI    Symptoms:           Do you have eye pain now?:  Yes   Location:  OD   Pain Level:  Severe Pain (7)   Pain Duration:  4 weeks   Pain Frequency:  Constant   Pain Characteristics:  Aching      Comments:  4 week post op LE  RE painful, use ice pack at times  Latanoprost every day BE  cosopt bid BE  alphagan bid BE  Nikole Alberto COA 1:08 PM September 25, 2018

## 2018-09-25 NOTE — PATIENT INSTRUCTIONS
Patient will continue on Latanoprost which is a teal top drop at bedtime in BOTH EYES, Cosopt (Timolol/Dorzolamide) which is a blue top drop 2x/day (12 hours apart) in BOTH EYES, Alphagan (Brimonidine) which is a purple top drop 2x/day (12 hours apart) in BOTH EYES.      A long discussion of the risks, benefits, and alternatives including potential treatment and management options were had with patient and a decision was made to check IOP prior to considering additional mCPC (laser) or tube surgery in the left eye.     Patient will return to clinic in 2-4 weeks with repeat IOP check.

## 2018-09-26 ENCOUNTER — TELEPHONE (OUTPATIENT)
Dept: OPHTHALMOLOGY | Facility: CLINIC | Age: 83
End: 2018-09-26

## 2018-09-26 NOTE — TELEPHONE ENCOUNTER
Note to glaucoma resident to review surgical recovery inquiries  Yaron Adams RN 5:44 PM 09/26/18

## 2018-09-26 NOTE — TELEPHONE ENCOUNTER
Health Call Center    Phone Message    May a detailed message be left on voicemail: yes    Reason for Call: Other: Pt's daughter Jony is inquiring about additional information in regards to the recovery process following Pt's surgery. Jony says Pt only has 1 good eye which is the eye that is being operated on. Jony would like to know how Pt's vision will be like, how long her eye is going to be covered for. The more information, the better so that she can let Pt know which will help decrease Pt's worry and anxiety. Please f/u with Jony at her home phone [phone]. Ok to leave detailed vm.     Action Taken: Message routed to:  [facility]

## 2018-10-04 ENCOUNTER — MEDICAL CORRESPONDENCE (OUTPATIENT)
Dept: HEALTH INFORMATION MANAGEMENT | Facility: CLINIC | Age: 83
End: 2018-10-04

## 2018-10-18 ENCOUNTER — TELEPHONE (OUTPATIENT)
Dept: OPHTHALMOLOGY | Facility: CLINIC | Age: 83
End: 2018-10-18

## 2018-10-18 ENCOUNTER — OFFICE VISIT (OUTPATIENT)
Dept: OPHTHALMOLOGY | Facility: CLINIC | Age: 83
End: 2018-10-18
Attending: OPHTHALMOLOGY
Payer: MEDICARE

## 2018-10-18 DIAGNOSIS — H40.1493 PXG (PSEUDOEXFOLIATION GLAUCOMA), SEVERE STAGE: Primary | ICD-10-CM

## 2018-10-18 DIAGNOSIS — H40.1493 PXG (PSEUDOEXFOLIATION GLAUCOMA), SEVERE STAGE: ICD-10-CM

## 2018-10-18 DIAGNOSIS — Z96.1 PSEUDOPHAKIA OF BOTH EYES: ICD-10-CM

## 2018-10-18 PROCEDURE — G0463 HOSPITAL OUTPT CLINIC VISIT: HCPCS | Mod: ZF

## 2018-10-18 RX ORDER — BRIMONIDINE TARTRATE 2 MG/ML
1 SOLUTION/ DROPS OPHTHALMIC 3 TIMES DAILY
Qty: 15 ML | Refills: 11 | Status: SHIPPED | OUTPATIENT
Start: 2018-10-18 | End: 2018-11-15

## 2018-10-18 RX ORDER — DORZOLAMIDE HYDROCHLORIDE AND TIMOLOL MALEATE 20; 5 MG/ML; MG/ML
1 SOLUTION/ DROPS OPHTHALMIC 2 TIMES DAILY
Qty: 1 BOTTLE | Refills: 11 | Status: SHIPPED | OUTPATIENT
Start: 2018-10-18

## 2018-10-18 RX ORDER — DORZOLAMIDE HCL 20 MG/ML
1 SOLUTION/ DROPS OPHTHALMIC 2 TIMES DAILY
Qty: 1 BOTTLE | Refills: 11 | Status: SHIPPED | OUTPATIENT
Start: 2018-10-18 | End: 2018-11-15

## 2018-10-18 RX ORDER — LATANOPROST 50 UG/ML
1 SOLUTION/ DROPS OPHTHALMIC AT BEDTIME
Qty: 1 BOTTLE | Refills: 11 | Status: SHIPPED | OUTPATIENT
Start: 2018-10-18 | End: 2018-10-18

## 2018-10-18 RX ORDER — DORZOLAMIDE HYDROCHLORIDE AND TIMOLOL MALEATE 20; 5 MG/ML; MG/ML
1 SOLUTION/ DROPS OPHTHALMIC 2 TIMES DAILY
Qty: 1 BOTTLE | Refills: 11 | Status: SHIPPED | OUTPATIENT
Start: 2018-10-18 | End: 2018-10-18

## 2018-10-18 RX ORDER — LATANOPROST 50 UG/ML
1 SOLUTION/ DROPS OPHTHALMIC AT BEDTIME
Qty: 1 BOTTLE | Refills: 11 | Status: SHIPPED | OUTPATIENT
Start: 2018-10-18

## 2018-10-18 RX ORDER — TIMOLOL MALEATE 5 MG/ML
1 SOLUTION/ DROPS OPHTHALMIC EVERY MORNING
Qty: 1 BOTTLE | Refills: 11 | Status: SHIPPED | OUTPATIENT
Start: 2018-10-18

## 2018-10-18 RX ORDER — BRIMONIDINE TARTRATE 2 MG/ML
1 SOLUTION/ DROPS OPHTHALMIC 3 TIMES DAILY
Qty: 15 ML | Refills: 11 | Status: SHIPPED | OUTPATIENT
Start: 2018-10-18 | End: 2018-10-18

## 2018-10-18 ASSESSMENT — CONF VISUAL FIELD
OD_SUPERIOR_TEMPORAL_RESTRICTION: 3
OS_NORMAL: 1
OD_INFERIOR_NASAL_RESTRICTION: 1
METHOD: COUNTING FINGERS
OD_SUPERIOR_NASAL_RESTRICTION: 1
OD_INFERIOR_TEMPORAL_RESTRICTION: 3

## 2018-10-18 ASSESSMENT — REFRACTION_WEARINGRX
OD_SPHERE: BALANCE
OS_SPHERE: -0.25
OS_AXIS: 010
OS_CYLINDER: +0.25

## 2018-10-18 ASSESSMENT — SLIT LAMP EXAM - LIDS
COMMENTS: NORMAL
COMMENTS: NORMAL

## 2018-10-18 ASSESSMENT — VISUAL ACUITY
OS_SC+: -2
OS_SC: 20/30
METHOD: SNELLEN - LINEAR
OD_SC: 20/400

## 2018-10-18 ASSESSMENT — EXTERNAL EXAM - LEFT EYE: OS_EXAM: NORMAL

## 2018-10-18 ASSESSMENT — TONOMETRY
OD_IOP_MMHG: 18
OS_IOP_MMHG: 20
OS_IOP_MMHG: 22
IOP_METHOD: APPLANATION
IOP_METHOD: APPLANATION

## 2018-10-18 NOTE — NURSING NOTE
Chief Complaints and History of Present Illnesses   Patient presents with     Glaucoma Follow Up     3 week follow up IOP check     HPI    Affected eye(s):  Both   Symptoms:     No floaters   No flashes   Redness   No Dryness   No itching         Do you have eye pain now?:  No      Comments:  Pt states vision is about the same as last visit. Pt having headache like eye pain in RE that comes and goes, relief with Tylenol    Rodney LARSON October 18, 2018 12:03 PM

## 2018-10-18 NOTE — PROGRESS NOTES
1)PXG -- s/p mCPC OS (8/27/18),  s/p TSCPC OD (9/2/16), s/p Tube OD x2 in past with removal in 2016, ?Trab OU given exam findings --  K pachy:566/583    Tmax: 38/27 (OS:42 on 7/18)   HVF: OD:dec MD with :Inf arcuate with SN dec  (poor reliability) and OS:Fluct    CDR:0.9/0.8     HRT/OCT: OD:Mild RNFl thinning (poor tracing) and OS:Mod RNFl thinning       FHX of Glc: No     Gonio:       Intolerant to: Methazolamide -- loss weight, decreased appetite, AGN -- redness and itching (per notes from 10+ yrs ago), Cosopt -- redness and itching per old notes from 10+ years ago (likely MITZY as she is tolerating T1/2)    Asthma/COPD: Yes, on inhalers but tolerating topical BB  Steroid Use: Yes, was on topical pred     Kidney Stones:  No   Sulfa Allergy: Yes, hives     IOP targets: -- IOP borderline  -- consider mCPC vs Tube   2)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 An at [facility]  3)PCIOL OU -- following with Dr. Dudley and Dr. Interiano at [facility]  4)H/O HZO OD with post-herpetic neuralgia -- on Acyclovir and Gabapentin  5)H/O Hemiretinal Vein Occlusion OD per old notes  6)H/O K Ulcer OD per old notes -- followed by Dr. De Guzman    MD:Drew Orlando (son-in-law) who is clergy accompanies to most appointments -- Jony Orlando (daughter is RN)    MD: pt referred by Dr. nIteriano for consideration of mCPC prior to tube surgery OS -- discussed possible options including Rhopressa, GATT/KDB, XEN, mCPC, Tube     MD: Patient never tried Cosopt or AGN given a reported H/O allergy from 10+ years ago -- retried (8/18) without any SE or issues per patient     MD:IOP improved today after mCPC on 2nd visit    Patient will continue on Latanoprost which is a teal top drop at bedtime in BOTH EYES, Cosopt (Timolol/Dorzolamide) which is a blue top drop 2x/day (12 hours apart) in BOTH EYES, Alphagan (Brimonidine) which is a purple top drop 2x/day (12 hours apart) in BOTH EYES.      A  long discussion of the risks, benefits, and alternatives including potential treatment and management options were had with patient and a decision was made to check IOP prior to considering additional mCPC (laser) or tube surgery in the left eye.     Patient will return to clinic in 2-4 weeks with repeat IOP check and visual field test (OD:LVC and OS:24-2), and discussion regarding possible glaucoma surgery.        Attending Physician Attestation:  Complete documentation of historical and exam elements from today's encounter can be found in the full encounter summary report (not reduplicated in this progress note). I personally obtained the chief complaint(s) and history of present illness.  I confirmed and edited as necessary the review of systems, past medical/surgical history, family history, social history, and examination findings as documented by others; and I examined the patient myself. I personally reviewed the relevant tests, images, and reports as documented above. I formulated and edited as necessary the assessment and plan and discussed the findings and management plan with the patient and family.  - Radha Morales MD

## 2018-10-18 NOTE — TELEPHONE ENCOUNTER
[facility] Call Center    Phone Message    May a detailed message be left on voicemail: yes    Reason for Call: Other: Pt's daughter calling requesting last visit notes with Dr. Morales to be sent to pupil specialist Dr. Marcelino De Guzman at [facility] in [place] as Dr. Morales requested for pt to see him. Fax number was not given      Action Taken: Message routed to:  [facility] ([facility]): Ophthamology

## 2018-10-18 NOTE — TELEPHONE ENCOUNTER
Note to  for f/u on record request  Yaron Adams RN 4:03 PM 10/18/18    Also pharmacy out of dorz/timolol-- separate Rx's sent

## 2018-10-18 NOTE — PATIENT INSTRUCTIONS
Patient will continue on Latanoprost which is a teal top drop at bedtime in BOTH EYES, Cosopt (Timolol/Dorzolamide) which is a blue top drop 2x/day (12 hours apart) in BOTH EYES, Alphagan (Brimonidine) which is a purple top drop 2x/day (12 hours apart) in BOTH EYES.      A long discussion of the risks, benefits, and alternatives including potential treatment and management options were had with patient and a decision was made to check IOP prior to considering additional mCPC (laser) or tube surgery in the left eye.     Patient will return to clinic in 2-4 weeks with repeat IOP check and visual field test (OD:LVC and OS:24-2), and discussion regarding possible glaucoma surgery.

## 2018-10-18 NOTE — MR AVS SNAPSHOT
After Visit Summary   10/18/2018    Kenia Edwards    MRN: [id number]           Patient Information     Date Of Birth          10/20/1931        Visit Information        Provider Department      10/18/2018 12:15 PM Radha Morales MD [facility]        Today's Diagnoses     Pxg (pseudoexfoliation glaucoma), severe stage    -  1    Pseudophakia of both eyes          Care Instructions    Patient will continue on Latanoprost which is a teal top drop at bedtime in BOTH EYES, Cosopt (Timolol/Dorzolamide) which is a blue top drop 2x/day (12 hours apart) in BOTH EYES, Alphagan (Brimonidine) which is a purple top drop 2x/day (12 hours apart) in BOTH EYES.      A long discussion of the risks, benefits, and alternatives including potential treatment and management options were had with patient and a decision was made to check IOP prior to considering additional mCPC (laser) or tube surgery in the left eye.     Patient will return to clinic in 2-4 weeks with repeat IOP check and visual field test (OD:LVC and OS:24-2), and discussion regarding possible glaucoma surgery.              Follow-ups after your visit        Follow-up notes from your care team     Return 2-4 weeks with repeat IOP check and visual field test (OD:LVC and OS:24-2), and discussion regarding.      Your next 10 appointments already scheduled     Oct 23, 2018  1:30 PM CDT   Post-Op with Radha Morales MD   Eye Clinic ([facility])    [facility] [address] [facility] [address] [place] [address]   [phone]            Nov 01, 2018  1:30 PM CDT   Post-Op with Radha Morales MD   Eye Clinic ([facility])    [facility] [address] [address]   [phone]              Who to contact     Please call your clinic at [phone] to:    Ask questions about your health    Make or cancel appointments    Discuss your medicines    Learn about  your test results    Speak to your doctor            Additional Information About Your Visit        [employer] Information     [employer] gives you secure access to your electronic health record. If you see a primary care provider, you can also send messages to your care team and make appointments. If you have questions, please call your primary care clinic.  If you do not have a primary care provider, please call [phone] and they will assist you.      [employer] is an electronic gateway that provides easy, online access to your medical records. With [employer], you can request a clinic appointment, read your test results, renew a prescription or communicate with your care team.     To access your existing account, please contact your [facility] or call [phone] for assistance.        Care EveryWhere ID     This is your Care EveryWhere ID. This could be used by other organizations to access your [place] medical records  [id number]         Blood Pressure from Last 3 Encounters:   No data found for BP    Weight from Last 3 Encounters:   No data found for Wt              Today, you had the following     No orders found for display       Primary Care Provider Office Phone # Fax #    Lucy Belle [phone]       [facility] [place] [address] [place] [address]        Equal Access to Services     JANET KEARNS : Hadii aad ku hadasho Soomaali, waaxda luqadaha, qaybta kaalmada adeegyada, nichole idiin hayalexein dunia ma. So [facility] [phone].    ATENCIÓN: Si habla español, tiene a hodges disposición servicios gratuitos de asistencia lingüística. Llame al [phone].    We comply with applicable federal civil rights laws and [place] laws. We do not discriminate on the basis of race, color, national origin, age, disability, sex, sexual orientation, or gender identity.            Thank you!     Thank you for choosing E[facility] CLINIC  for your care.  Our goal is always to provide you with excellent care. Hearing back from our patients is one way we can continue to improve our services. Please take a few minutes to complete the written survey that you may receive in the mail after your visit with us. Thank you!             Your Updated Medication List - Protect others around you: Learn how to safely use, store and throw away your medicines at www.disposemymeds.org.          This list is accurate as of 10/18/18 12:45 PM.  Always use your most recent med list.                   Brand Name Dispense Instructions for use Diagnosis    acyclovir 800 MG tablet    ZOVIRAX     Take 1 tablet by mouth 4 times daily        albuterol 108 (90 Base) MCG/ACT inhaler    PROAIR HFA/PROVENTIL HFA/VENTOLIN HFA     Inhale 2 puffs into the lungs daily    Glaucoma suspect, bilateral       ascorbic acid 500 MG Tabs           brimonidine 0.2 % ophthalmic solution    ALPHAGAN          dorzolamide-timolol 2-0.5 % ophthalmic solution    COSOPT          * Ergocalciferol 400 units Tabs           * Vitamin D2 400 units Tabs      Take 1 tablet by mouth once        latanoprost 0.005 % ophthalmic solution    XALATAN    1 Bottle    Place 1 drop into both eyes At Bedtime    Pxg (pseudoexfoliation glaucoma), severe stage       * PREGABALIN PO      Take 1 capsule by mouth 2 times daily    Glaucoma suspect, bilateral       * LYRICA 75 MG capsule   Generic drug:  pregabalin      Take 1 tablet by mouth once        REFRESH CELLUVISC OP           REFRESH P.M. OP           * Notice:  This list has 4 medication(s) that are the same as other medications prescribed for you. Read the directions carefully, and ask your doctor or other care provider to review them with you.

## 2018-11-14 DIAGNOSIS — H40.1493 PXG (PSEUDOEXFOLIATION GLAUCOMA), SEVERE STAGE: Primary | ICD-10-CM

## 2018-11-15 ENCOUNTER — OFFICE VISIT (OUTPATIENT)
Dept: OPHTHALMOLOGY | Facility: CLINIC | Age: 83
End: 2018-11-15
Attending: OPHTHALMOLOGY
Payer: MEDICARE

## 2018-11-15 DIAGNOSIS — H40.1493 PXG (PSEUDOEXFOLIATION GLAUCOMA), SEVERE STAGE: ICD-10-CM

## 2018-11-15 DIAGNOSIS — Z96.1 PSEUDOPHAKIA OF BOTH EYES: Primary | ICD-10-CM

## 2018-11-15 PROCEDURE — G0463 HOSPITAL OUTPT CLINIC VISIT: HCPCS | Mod: ZF

## 2018-11-15 PROCEDURE — 92083 EXTENDED VISUAL FIELD XM: CPT | Mod: ZF | Performed by: OPHTHALMOLOGY

## 2018-11-15 RX ORDER — BRIMONIDINE TARTRATE 1 MG/ML
1 SOLUTION/ DROPS OPHTHALMIC 2 TIMES DAILY
COMMUNITY

## 2018-11-15 ASSESSMENT — VISUAL ACUITY
OS_SC+: -1
OS_SC: 20/25
OD_SC: 20/400ECC
METHOD: SNELLEN - LINEAR

## 2018-11-15 ASSESSMENT — TONOMETRY
IOP_METHOD: APPLANATION
OS_IOP_MMHG: 28
OS_IOP_MMHG: 28
OD_IOP_MMHG: 15
OS_IOP_MMHG: 29
OS_IOP_MMHG: 20
IOP_METHOD: TONOPEN
IOP_METHOD: TONOPEN
OS_IOP_MMHG: 21
IOP_METHOD: APPLANATION
IOP_METHOD: APPLANATION
OD_IOP_MMHG: 15

## 2018-11-15 ASSESSMENT — CONF VISUAL FIELD
METHOD: COUNTING FINGERS
OD_SUPERIOR_NASAL_RESTRICTION: 1
OS_NORMAL: 1
OD_SUPERIOR_TEMPORAL_RESTRICTION: 3
OD_INFERIOR_TEMPORAL_RESTRICTION: 3
OD_INFERIOR_NASAL_RESTRICTION: 1

## 2018-11-15 ASSESSMENT — EXTERNAL EXAM - LEFT EYE: OS_EXAM: NORMAL

## 2018-11-15 ASSESSMENT — REFRACTION_MANIFEST
OS_AXIS: 010
OD_SPHERE: BALANCE
OS_CYLINDER: +0.25
OS_SPHERE: -0.50

## 2018-11-15 ASSESSMENT — SLIT LAMP EXAM - LIDS
COMMENTS: NORMAL
COMMENTS: NORMAL

## 2018-11-15 NOTE — PATIENT INSTRUCTIONS
Patient will continue on Latanoprost which is a teal top drop at bedtime in BOTH EYES, Cosopt (Timolol/Dorzolamide) which is a blue top drop 2x/day (12 hours apart) in BOTH EYES, Alphagan (Brimonidine) which is a purple top drop 2x/day (12 hours apart) in BOTH EYES.      A long discussion of the risks, benefits, and alternatives including potential treatment and management options were had with patient and a decision was made to re-check IOP one more time prior to considering additional mCPC (laser) or tube surgery in the left eye.     Patient will return to clinic in 2-4 weeks with repeat IOP check and discussion regarding possible glaucoma surgery.

## 2018-11-15 NOTE — MR AVS SNAPSHOT
After Visit Summary   11/15/2018    Kenia Edwards    MRN: [id number]           Patient Information     Date Of Birth          10/20/1931        Visit Information        Provider Department      11/15/2018 12:15 PM Radha Morales MD [facility]        Today's Diagnoses     Pseudophakia of both eyes    -  1    Pxg (pseudoexfoliation glaucoma), severe stage          Care Instructions    Patient will continue on Latanoprost which is a teal top drop at bedtime in BOTH EYES, Cosopt (Timolol/Dorzolamide) which is a blue top drop 2x/day (12 hours apart) in BOTH EYES, Alphagan (Brimonidine) which is a purple top drop 2x/day (12 hours apart) in BOTH EYES.      A long discussion of the risks, benefits, and alternatives including potential treatment and management options were had with patient and a decision was made to re-check IOP one more time prior to considering additional mCPC (laser) or tube surgery in the left eye.     Patient will return to clinic in 2-4 weeks with repeat IOP check and discussion regarding possible glaucoma surgery.              Follow-ups after your visit        Follow-up notes from your care team     Return  2-4 weeks with repeat IOP check and discussion regarding possible glaucoma surgery.  .      Your next 10 appointments already scheduled     Dec 06, 2018  2:45 PM CST   RETURN GLAUCOMA with Radha Morales MD   Eye Clinic ([facility])    [address] [id number] [phone]              Who to contact     Please call your clinic at [phone] to:    Ask questions about your health    Make or cancel appointments    Discuss your medicines    Learn about your test results    Speak to your doctor            Additional Information About Your Visit        MyChart Information     [employer] gives you secure access to your electronic health record. If you see a primary care provider, you can also send  messages to your care team and make appointments. If you have questions, please call your primary care clinic.  If you do not have a primary care provider, please call [phone] and they will assist you.      [employer] is an electronic gateway that provides easy, online access to your medical records. With [employer], you can request a clinic appointment, read your test results, renew a prescription or communicate with your care team.     To access your existing account, please contact your [facility] or call [phone] for assistance.        Care EveryWhere ID     This is your Care EveryWhere ID. This could be used by other organizations to access your [place] medical records  [id number]         Blood Pressure from Last 3 Encounters:   No data found for BP    Weight from Last 3 Encounters:   No data found for Wt              We Performed the Following     Low Vision [facility] OD     OVF 24-2 Dynamic OS        Primary Care Provider Office Phone # Fax #    Lucy Belle [phone]       [facility] [place] [address] [place] [address]        Equal Access to Services     [facility]: Hadii aad ku hadasho Soomaali, waaxda luqadaha, qaybta kaalmada adeegyada, waxay idiin hayaan dunia juarez . So [facility] [phone].    ATENCIÓN: Si habla español, tiene a hodges disposición servicios gratuitos de asistencia lingüística. Llame al [phone].    We comply with applicable federal civil rights laws and [place] laws. We do not discriminate on the basis of race, color, national origin, age, disability, sex, sexual orientation, or gender identity.            Thank you!     Thank you for choosing [facility]  for your care. Our goal is always to provide you with excellent care. Hearing back from our patients is one way we can continue to improve our services. Please take a few minutes to complete the written survey that you may receive in the  mail after your visit with us. Thank you!             Your Updated Medication List - Protect others around you: Learn how to safely use, store and throw away your medicines at www.disposemymeds.org.          This list is accurate as of 11/15/18  1:52 PM.  Always use your most recent med list.                   Brand Name Dispense Instructions for use Diagnosis    acyclovir 800 MG tablet    ZOVIRAX     Take 1 tablet by mouth 4 times daily        albuterol 108 (90 Base) MCG/ACT inhaler    PROAIR HFA/PROVENTIL HFA/VENTOLIN HFA     Inhale 2 puffs into the lungs daily    Glaucoma suspect, bilateral       ascorbic acid 500 MG Tabs           brimonidine 0.1 % ophthalmic solution    ALPHAGAN P     Apply 1 drop to eye 2 times daily        dorzolamide-timolol 2-0.5 % ophthalmic solution    COSOPT    1 Bottle    Place 1 drop into both eyes 2 times daily    Pxg (pseudoexfoliation glaucoma), severe stage       * Ergocalciferol 400 units Tabs           * Vitamin D2 400 units Tabs      Take 1 tablet by mouth once        latanoprost 0.005 % ophthalmic solution    XALATAN    1 Bottle    Place 1 drop into both eyes At Bedtime    Pxg (pseudoexfoliation glaucoma), severe stage       * PREGABALIN PO      Take 1 capsule by mouth 2 times daily    Glaucoma suspect, bilateral       * LYRICA 75 MG capsule   Generic drug:  pregabalin      Take 1 tablet by mouth once        REFRESH CELLUVISC OP           REFRESH P.M. OP           timolol 0.5 % ophthalmic solution    TIMOPTIC    1 Bottle    Place 1 drop into both eyes every morning    Pxg (pseudoexfoliation glaucoma), severe stage       * Notice:  This list has 4 medication(s) that are the same as other medications prescribed for you. Read the directions carefully, and ask your doctor or other care provider to review them with you.

## 2018-11-15 NOTE — NURSING NOTE
"Chief Complaints and History of Present Illnesses   Patient presents with     Glaucoma Follow Up     4wk bilateral PXG OD>>OS     HPI    Affected eye(s):  Both   Symptoms:     Blurred vision   No decreased vision   No distorted vision   No floaters   No flashes   No redness   Foreign body sensation   No tearing   Dryness   No itching   No burning   Photophobia   No eye discharge         Do you have eye pain now?:  Yes   Location:  OD   Pain Level:  Severe Pain (7)   Pain Duration:  1 month   Pain Frequency:  Constant   Pain Characteristics:  Aching      Comments:  Vision is stable since LV, still blurry OD>OS.      C/o ocular pain of the RE, attributes this to the \"calcuim deposits\" that are causing a FB sensation and irritation. Manages pain w/tylenol PO tid. Per Pt, has appt w/Dr De Guzman for ocular procedure this Monday 11/19 for calcium deposit removal.     Ocular meds: Truspot bid each eye, Cosopt bid each eye, Alphagan bid OU  Sienna Cerda COT 12:25 PM November 15, 2018                "

## 2018-11-15 NOTE — PROGRESS NOTES
1)PXG -- s/p mCPC OS (8/27/18),  s/p TSCPC OD (9/2/16), s/p Tube OD x2 in past with removal in 2016, ?Trab OU given exam findings --  K pachy:566/583    Tmax: 38/27 (OS:42 on 7/18)   HVF: OD:dec MD with :Inf arcuate with SN dec  (poor reliability) and OS:Fluct    CDR:0.9/0.8     HRT/OCT: OD:Mild RNFl thinning (poor tracing) and OS:Mod RNFl thinning       FHX of Glc: No     Gonio:       Intolerant to: Methazolamide -- loss weight, decreased appetite, AGN -- redness and itching (per notes from 10+ yrs ago), Cosopt -- redness and itching per old notes from 10+ years ago (likely MITZY as she is tolerating T1/2)    Asthma/COPD: Yes, on inhalers but tolerating topical BB  Steroid Use: Yes, was on topical pred     Kidney Stones:  No   Sulfa Allergy: Yes, hives     IOP targets: -- IOP borderline  -- consider mCPC vs Tube   2)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 An at [facility]  3)PCIOL OU -- following with Dr. Dudley and Dr. Interiano at [facility]  4)H/O HZO OD with post-herpetic neuralgia -- on Acyclovir and Gabapentin  5)H/O Hemiretinal Vein Occlusion OD per old notes  6)H/O K Ulcer OD per old notes -- followed by Dr. Gege ESCOBAR:Drew Orlando (son-in-law) who is clergy accompanies to most appointments -- Jony Orlando (daughter is RN)    MD: pt referred by Dr. Interiano for consideration of mCPC prior to tube surgery OS -- discussed possible options including Rhopressa, GATT/KDB, XEN, mCPC, Tube     MD: Patient never tried Cosopt or AGN given a reported H/O allergy from 10+ years ago -- retried (8/18) without any SE or issues per patient       Patient will continue on Latanoprost which is a teal top drop at bedtime in BOTH EYES, Cosopt (Timolol/Dorzolamide) which is a blue top drop 2x/day (12 hours apart) in BOTH EYES, Alphagan (Brimonidine) which is a purple top drop 2x/day (12 hours apart) in BOTH EYES.      A long discussion of the risks, benefits, and  alternatives including potential treatment and management options were had with patient and a decision was made to re-check IOP one more time prior to considering additional mCPC (laser) or tube surgery in the left eye.     Patient will return to clinic in 2-4 weeks with repeat IOP check and discussion regarding possible glaucoma surgery.        Attending Physician Attestation:  Complete documentation of historical and exam elements from today's encounter can be found in the full encounter summary report (not reduplicated in this progress note). I personally obtained the chief complaint(s) and history of present illness.  I confirmed and edited as necessary the review of systems, past medical/surgical history, family history, social history, and examination findings as documented by others; and I examined the patient myself. I personally reviewed the relevant tests, images, and reports as documented above. I formulated and edited as necessary the assessment and plan and discussed the findings and management plan with the patient and family.  - Radha Morales MD

## 2018-12-06 ENCOUNTER — OFFICE VISIT (OUTPATIENT)
Dept: OPHTHALMOLOGY | Facility: CLINIC | Age: 83
End: 2018-12-06
Attending: OPHTHALMOLOGY
Payer: MEDICARE

## 2018-12-06 DIAGNOSIS — H40.1493 PXG (PSEUDOEXFOLIATION GLAUCOMA), SEVERE STAGE: Primary | ICD-10-CM

## 2018-12-06 DIAGNOSIS — Z96.1 PSEUDOPHAKIA OF BOTH EYES: ICD-10-CM

## 2018-12-06 PROCEDURE — G0463 HOSPITAL OUTPT CLINIC VISIT: HCPCS | Mod: ZF

## 2018-12-06 ASSESSMENT — VISUAL ACUITY
OS_SC: 20/30
METHOD: SNELLEN - LINEAR
OD_SC: CF @ 3FT
OS_SC+: -1

## 2018-12-06 ASSESSMENT — TONOMETRY
IOP_METHOD: APPLANATION
OS_IOP_MMHG: 32
IOP_METHOD: APPLANATION
OS_IOP_MMHG: 25
IOP_METHOD: TONOPEN
OS_IOP_MMHG: 31

## 2018-12-06 ASSESSMENT — EXTERNAL EXAM - LEFT EYE: OS_EXAM: NORMAL

## 2018-12-06 ASSESSMENT — SLIT LAMP EXAM - LIDS
COMMENTS: NORMAL
COMMENTS: NORMAL

## 2018-12-06 NOTE — PROGRESS NOTES
1)PXG -- s/p mCPC OS (8/27/18),  s/p TSCPC OD (9/2/16), s/p Tube OD x2 in past with removal in 2016, ?Trab OU given exam findings --  K pachy:566/583    Tmax: 38/27 (OS:42 on 7/18)   HVF: OD:dec MD with :Inf arcuate with SN dec  (poor reliability) and OS:Fluct    CDR:0.9/0.8     HRT/OCT: OD:Mild RNFl thinning (poor tracing) and OS:Mod RNFl thinning       FHX of Glc: No     Gonio:       Intolerant to: Methazolamide -- loss weight, decreased appetite, AGN -- redness and itching (per notes from 10+ yrs ago), Cosopt -- redness and itching per old notes from 10+ years ago (likely MITZY as she is tolerating T1/2)    Asthma/COPD: Yes, on inhalers but tolerating topical BB  Steroid Use: Yes, was on topical pred     Kidney Stones:  No   Sulfa Allergy: Yes, hives     IOP targets: -- IOP borderline  -- consider mCPC vs Tube   2)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 An at [facility]  3)PCIOL OU -- following with Dr. Dudley and Dr. Interiano at [facility]  4)H/O HZO OD with post-herpetic neuralgia -- on Acyclovir and Gabapentin  5)H/O Hemiretinal Vein Occlusion OD per old notes  6)H/O K Ulcer OD per old notes -- followed by Dr. De Guzman    MD:Drew Orlando (son-in-law) who is clergy accompanies to most appointments -- Jony Orlando (daughter is RN)    MD: pt referred by Dr. Interiano for consideration of mCPC prior to tube surgery OS -- discussed possible options including Rhopressa, GATT/KDB, XEN, mCPC, Tube     MD: Patient never tried Cosopt or AGN given a reported H/O allergy from 10+ years ago -- retried (8/18) without any SE or issues per patient     MD:will use tutoplast sclera patch graft for left eye       Patient will continue on Latanoprost which is a teal top drop at bedtime in BOTH EYES, Cosopt (Timolol/Dorzolamide) which is a blue top drop 2x/day (12 hours apart) in BOTH EYES, Alphagan (Brimonidine) which is a purple top drop 2x/day (12 hours apart) in BOTH  EYES.      A long discussion of the risks, benefits, and alternatives including potential treatment and management options were had with patient and a decision was made to proceed with  tube surgery in the left eye.       Resident Note:  EDTA chelation 11/28/18, Right eye  Left eye IOP 25 today, still probably too high given monocular status.  Would consider mCPC prior to tube surgery.     Hannah Worthington MD  PGY-3 Ophthalmology      Attending Physician Attestation:  Complete documentation of historical and exam elements from today's encounter can be found in the full encounter summary report (not reduplicated in this progress note). I personally obtained the chief complaint(s) and history of present illness.  I confirmed and edited as necessary the review of systems, past medical/surgical history, family history, social history, and examination findings as documented by others; and I examined the patient myself. I personally reviewed the relevant tests, images, and reports as documented above. I formulated and edited as necessary the assessment and plan and discussed the findings and management plan with the patient and family.  - Radha Morales MD

## 2018-12-06 NOTE — PATIENT INSTRUCTIONS
Patient will continue on Latanoprost which is a teal top drop at bedtime in BOTH EYES, Cosopt (Timolol/Dorzolamide) which is a blue top drop 2x/day (12 hours apart) in BOTH EYES, Alphagan (Brimonidine) which is a purple top drop 2x/day (12 hours apart) in BOTH EYES.      A long discussion of the risks, benefits, and alternatives including potential treatment and management options were had with patient and a decision was made to proceed with  tube surgery in the left eye.

## 2018-12-06 NOTE — NURSING NOTE
Chief Complaints and History of Present Illnesses   Patient presents with     Glaucoma Follow Up     HPI    Last Eye Exam:  11/15/18   Affected eye(s):  Both   Symptoms:        Duration:  2 weeks   Frequency:  Constant       Do you have eye pain now?:  No      Comments:  Pt returns of for 2 week follow up. Vision is stable LE. Pt notes that she had a Corneal Scraping RE 11-28-18, very painful and very photophobic. No problems using drop and last drop used this morning at 830 AM. Here with son today. NAEEM RUIZ, COA 3:08 PM 12/06/2018

## 2018-12-06 NOTE — MR AVS SNAPSHOT
After Visit Summary   12/6/2018    Kenia Edwards    MRN: [id number]           Patient Information     Date Of Birth          10/20/1931        Visit Information        Provider Department      12/6/2018 2:45 PM Radha Morales MD [facility]        Today's Diagnoses     Pxg (pseudoexfoliation glaucoma), severe stage    -  1    Pseudophakia of both eyes          Care Instructions    Patient will continue on Latanoprost which is a teal top drop at bedtime in BOTH EYES, Cosopt (Timolol/Dorzolamide) which is a blue top drop 2x/day (12 hours apart) in BOTH EYES, Alphagan (Brimonidine) which is a purple top drop 2x/day (12 hours apart) in BOTH EYES.      A long discussion of the risks, benefits, and alternatives including potential treatment and management options were had with patient and a decision was made to proceed with  tube surgery in the left eye.           Follow-ups after your visit        Your next 10 appointments already scheduled     Power 15, 2019 12:15 PM CST   Post-Op with Radha Morales MD   Eye Clinic ([facility])    [address] [address]   [phone]            Jan 24, 2019 12:15 PM CST   Post-Op with Radha Morales MD   Eye Clinic ([facility])    [address] [address]   [phone]              Who to contact     Please call your clinic at [phone] to:    Ask questions about your health    Make or cancel appointments    Discuss your medicines    Learn about your test results    Speak to your doctor            Additional Information About Your Visit        MyChart Information     [employer] gives you secure access to your electronic health record. If you see a primary care provider, you can also send messages to your care team and make appointments. If you have questions, please call your primary care clinic.  If you do not  have a primary care provider, please call [phone] and they will assist you.      The Switch is an electronic gateway that provides easy, online access to your medical records. With [employer], you can request a clinic appointment, read your test results, renew a prescription or communicate with your care team.     To access your existing account, please contact your [facility] or call [phone] for assistance.        Care EveryWhere ID     This is your Care EveryWhere ID. This could be used by other organizations to access your [place] medical records  [id number]         Blood Pressure from Last 3 Encounters:   No data found for BP    Weight from Last 3 Encounters:   No data found for Wt              We Performed the Following     Sissy-Operative Worksheet (Glaucoma)        Primary Care Provider Office Phone # Fax #    Lucy Belle [phone]       [facility] [place] [address] [place] [address]        Equal Access to Services     JANET KEARNS : Hadii aad ku hadasho Soomaali, waaxda luqadaha, qaybta kaalmada adeegyada, waxay idiin hayaan adeeg kharash lawilla . So [facility] [phone].    ATENCIÓN: Si habla español, tiene a hodges disposición servicios gratuitos de asistencia lingüística. Antonietaame al [phone].    We comply with applicable federal civil rights laws and [place] laws. We do not discriminate on the basis of race, color, national origin, age, disability, sex, sexual orientation, or gender identity.            Thank you!     Thank you for choosing [facility] CLINIC  for your care. Our goal is always to provide you with excellent care. Hearing back from our patients is one way we can continue to improve our services. Please take a few minutes to complete the written survey that you may receive in the mail after your visit with us. Thank you!             Your Updated Medication List - Protect others around you: Learn how to safely use,  store and throw away your medicines at www.disposemymeds.org.          This list is accurate as of 12/6/18  4:19 PM.  Always use your most recent med list.                   Brand Name Dispense Instructions for use Diagnosis    acyclovir 800 MG tablet    ZOVIRAX     Take 1 tablet by mouth 4 times daily        albuterol 108 (90 Base) MCG/ACT inhaler    PROAIR HFA/PROVENTIL HFA/VENTOLIN HFA     Inhale 2 puffs into the lungs daily    Glaucoma suspect, bilateral       ascorbic acid 500 MG Tabs           brimonidine 0.1 % ophthalmic solution    ALPHAGAN P     Apply 1 drop to eye 2 times daily        dorzolamide-timolol 2-0.5 % ophthalmic solution    COSOPT    1 Bottle    Place 1 drop into both eyes 2 times daily    Pxg (pseudoexfoliation glaucoma), severe stage       * Ergocalciferol 400 units Tabs           * ergocalciferol 400 units (10 mcg) Tabs tablet    VITAMIN D2     Take 1 tablet by mouth once        latanoprost 0.005 % ophthalmic solution    XALATAN    1 Bottle    Place 1 drop into both eyes At Bedtime    Pxg (pseudoexfoliation glaucoma), severe stage       * PREGABALIN PO      Take 1 capsule by mouth 2 times daily    Glaucoma suspect, bilateral       * LYRICA 75 MG capsule   Generic drug:  pregabalin      Take 1 tablet by mouth once        REFRESH CELLUVISC OP           REFRESH P.M. OP           timolol maleate 0.5 % ophthalmic solution    TIMOPTIC    1 Bottle    Place 1 drop into both eyes every morning    Pxg (pseudoexfoliation glaucoma), severe stage       * Notice:  This list has 4 medication(s) that are the same as other medications prescribed for you. Read the directions carefully, and ask your doctor or other care provider to review them with you.

## 2019-01-07 ENCOUNTER — TELEPHONE (OUTPATIENT)
Dept: OPHTHALMOLOGY | Facility: CLINIC | Age: 84
End: 2019-01-07

## 2019-01-07 NOTE — TELEPHONE ENCOUNTER
[facility] Call [facility]    Phone Message    May a detailed message be left on voicemail: yes    Reason for Call: Other: Pt daughter requests a phone call from the enma-op  to clarify some information.  Please have Hamida OTERO call back at [phone]     Action Taken: Message routed to:  [facility] ([facility] eye clinic

## 2019-01-14 ENCOUNTER — TRANSFERRED RECORDS (OUTPATIENT)
Dept: HEALTH INFORMATION MANAGEMENT | Facility: CLINIC | Age: 84
End: 2019-01-14

## 2019-01-15 ENCOUNTER — OFFICE VISIT (OUTPATIENT)
Dept: OPHTHALMOLOGY | Facility: CLINIC | Age: 84
End: 2019-01-15
Attending: OPHTHALMOLOGY
Payer: COMMERCIAL

## 2019-01-15 DIAGNOSIS — H40.1493 PXG (PSEUDOEXFOLIATION GLAUCOMA), SEVERE STAGE: Primary | ICD-10-CM

## 2019-01-15 PROCEDURE — G0463 HOSPITAL OUTPT CLINIC VISIT: HCPCS | Mod: ZF

## 2019-01-15 ASSESSMENT — VISUAL ACUITY
OS_SC: 20/30
OS_SC+: -
METHOD: SNELLEN - LINEAR

## 2019-01-15 ASSESSMENT — SLIT LAMP EXAM - LIDS
COMMENTS: NORMAL
COMMENTS: NORMAL

## 2019-01-15 ASSESSMENT — TONOMETRY: OS_IOP_MMHG: 7

## 2019-01-15 ASSESSMENT — EXTERNAL EXAM - LEFT EYE: OS_EXAM: NORMAL

## 2019-01-15 NOTE — PATIENT INSTRUCTIONS
Patient will continue on Latanoprost which is a teal top drop at bedtime in the RIGHT EYE ONLY, Cosopt (Timolol/Dorzolamide) which is a blue top drop 2x/day (12 hours apart) in the RIGHT EYE ONLY, Alphagan (Brimonidine) which is a purple top drop 2x/day (12 hours apart) in the RIGHT EYE ONLY.   No heavy lifting, bending, stooping, straining, rubbing the eye, or getting water in the eye.  Please wear protective eyewear over the eye at all times including glasses during the day and the protective shield at bedtime.  Patient will return to clinic in 1 week with repeat evaluation.    Use the following drops (LEFT EYE ONLY):  Antibiotic --  Ciprofloxacin: 4x/day until finished (use for at least 5-7 days after surgery)    Steroid -- Pred Forte/Prednisolone Acetate: every 2 hours while awake    Please be sure to call if you have any decrease in vision, increase in pain, flashing lights, redness, or a lot of drainage.

## 2019-01-15 NOTE — PROGRESS NOTES
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), s/p Tube OD x2 in past with removal in 2016, ?Trab OU given exam findings --  K pachy:566/583    Tmax: 38/27 (OS:42 on 7/18)   HVF: OD:dec MD with :Inf arcuate with SN dec  (poor reliability) and OS:Fluct    CDR:0.9/0.8     HRT/OCT: OD:Mild RNFl thinning (poor tracing) and OS:Mod RNFl thinning       FHX of Glc: No     Gonio:       Intolerant to: Methazolamide -- loss weight, decreased appetite, AGN -- redness and itching (per notes from 10+ yrs ago), Cosopt -- redness and itching per old notes from 10+ years ago (likely MITZY as she is tolerating T1/2)    Asthma/COPD: Yes, on inhalers but tolerating topical BB  Steroid Use: Yes, was on topical pred     Kidney Stones:  No   Sulfa Allergy: Yes, hives     IOP targets: -- IOP borderline  -- consider mCPC vs Tube   2)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 An at [facility]  3)PCIOL OU -- following with Dr. Dudley and Dr. Interiano at [facility]  4)H/O HZO OD with post-herpetic neuralgia -- on Acyclovir and Gabapentin  5)H/O Hemiretinal Vein Occlusion OD per old notes  6)H/O K Ulcer OD per old notes -- followed by Dr. De Guzman    MD:Drew Orlando (son-in-law) who is clergy accompanies to most appointments -- Jony Orlando (daughter is RN)    MD: pt referred by Dr. Interiano for consideration of mCPC prior to tube surgery OS -- discussed possible options including Rhopressa, GATT/KDB, XEN, mCPC, Tube     MD: Patient never tried Cosopt or AGN given a reported H/O allergy from 10+ years ago -- retried (8/18) without any SE or issues per patient     MD:will use tutoplast sclera patch graft for left eye       Patient will continue on Latanoprost which is a teal top drop at bedtime in the RIGHT EYE ONLY, Cosopt (Timolol/Dorzolamide) which is a blue top drop 2x/day (12 hours apart) in the RIGHT EYE ONLY, Alphagan (Brimonidine) which is a purple top  drop 2x/day (12 hours apart) in the RIGHT EYE ONLY.   No heavy lifting, bending, stooping, straining, rubbing the eye, or getting water in the eye.  Please wear protective eyewear over the eye at all times including glasses during the day and the protective shield at bedtime.  Patient will return to clinic in 1 week with repeat evaluation.    Use the following drops (LEFT EYE ONLY):  Antibiotic --  Ciprofloxacin: 4x/day until finished (use for at least 5-7 days after surgery)    Steroid -- Pred Forte/Prednisolone Acetate: every 2 hours while awake    Please be sure to call if you have any decrease in vision, increase in pain, flashing lights, redness, or a lot of drainage.          Attending Physician Attestation:  Complete documentation of historical and exam elements from today's encounter can be found in the full encounter summary report (not reduplicated in this progress note). I personally obtained the chief complaint(s) and history of present illness.  I confirmed and edited as necessary the review of systems, past medical/surgical history, family history, social history, and examination findings as documented by others; and I examined the patient myself. I personally reviewed the relevant tests, images, and reports as documented above. I formulated and edited as necessary the assessment and plan and discussed the findings and management plan with the patient and family.  - Radha Morales MD

## 2019-01-15 NOTE — NURSING NOTE
Chief Complaints and History of Present Illnesses   Patient presents with     Post Op (Ophthalmology) Left Eye     LEFT AHMED Glaucoma Valve TUBE INSERTION with tutopast sclera patch graft     Chief Complaint(s) and History of Present Illness(es)     Post Op (Ophthalmology) Left Eye     Laterality: left eye    Onset: 1 day ago    Frequency: constantly    Course: stable    Associated symptoms: Negative for eye pain    Treatments tried: eye drops    Pain scale: 0/10    Comments: LEFT AHMED Glaucoma Valve TUBE INSERTION with tutopast sclera patch graft              Comments     S/p LEFT AHMED Glaucoma Valve TUBE INSERTION with tutopast sclera patch graft 1/14/19. No eye pain. Have not removed patch. Still using drops in right eye.    Suzanne Rayo COT 12:06 PM January 15, 2019

## 2019-01-24 ENCOUNTER — OFFICE VISIT (OUTPATIENT)
Dept: OPHTHALMOLOGY | Facility: CLINIC | Age: 84
End: 2019-01-24
Attending: OPHTHALMOLOGY
Payer: COMMERCIAL

## 2019-01-24 DIAGNOSIS — Z96.1 PSEUDOPHAKIA OF BOTH EYES: ICD-10-CM

## 2019-01-24 DIAGNOSIS — H40.1493 PXG (PSEUDOEXFOLIATION GLAUCOMA), SEVERE STAGE: Primary | ICD-10-CM

## 2019-01-24 PROCEDURE — G0463 HOSPITAL OUTPT CLINIC VISIT: HCPCS | Mod: ZF

## 2019-01-24 ASSESSMENT — TONOMETRY
OS_IOP_MMHG: 10
IOP_METHOD: APPLANATION
OD_IOP_MMHG: 8

## 2019-01-24 ASSESSMENT — SLIT LAMP EXAM - LIDS
COMMENTS: NORMAL
COMMENTS: NORMAL

## 2019-01-24 ASSESSMENT — EXTERNAL EXAM - LEFT EYE: OS_EXAM: NORMAL

## 2019-01-24 ASSESSMENT — VISUAL ACUITY
METHOD: SNELLEN - LINEAR
OS_SC: 20/50
OS_PH_SC+: +2
OS_PH_SC: 20/30

## 2019-01-24 NOTE — PATIENT INSTRUCTIONS
Patient will continue on Latanoprost which is a teal top drop at bedtime in the RIGHT EYE ONLY, Cosopt (Timolol/Dorzolamide) which is a blue top drop 2x/day (12 hours apart) in the RIGHT EYE ONLY, Alphagan (Brimonidine) which is a purple top drop 2x/day (12 hours apart) in the RIGHT EYE ONLY.   No heavy lifting, bending, stooping, straining, rubbing the eye, or getting water in the eye.  Please wear protective eyewear over the eye at all times including glasses during the day and the protective shield at bedtime.  Patient will return to clinic in 3-4 weeks with repeat evaluation.    Use the following drops (LEFT EYE ONLY):  Antibiotic --  Ciprofloxacin: Discontinue    Steroid -- Pred Forte/Prednisolone Acetate: every 2 hours while awake for 1 week then 4x/day until seen    Please be sure to call if you have any decrease in vision, increase in pain, flashing lights, redness, or a lot of drainage.

## 2019-01-24 NOTE — PROGRESS NOTES
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), s/p Tube OD x2 in past with removal in 2016, ?Trab OU given exam findings --  K pachy:566/583    Tmax: 38/27 (OS:42 on 7/18)   HVF: OD:dec MD with :Inf arcuate with SN dec  (poor reliability) and OS:Fluct    CDR:0.9/0.8     HRT/OCT: OD:Mild RNFl thinning (poor tracing) and OS:Mod RNFl thinning       FHX of Glc: No     Gonio:       Intolerant to: Methazolamide -- loss weight, decreased appetite, AGN -- redness and itching (per notes from 10+ yrs ago), Cosopt -- redness and itching per old notes from 10+ years ago (likely MITZY as she is tolerating T1/2)    Asthma/COPD: Yes, on inhalers but tolerating topical BB  Steroid Use: Yes, was on topical pred     Kidney Stones:  No   Sulfa Allergy: Yes, hives     IOP targets: -- IOP borderline  -- consider mCPC vs Tube   2)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 An at [facility]  3)PCIOL OU -- following with Dr. Dudley and Dr. Interiano at [facility]  4)H/O HZO OD with post-herpetic neuralgia -- on Acyclovir and Gabapentin  5)H/O Hemiretinal Vein Occlusion OD per old notes  6)H/O K Ulcer OD per old notes -- followed by Dr. De Guzman    MD:Drew Orlando (son-in-law) who is clergy accompanies to most appointments -- Jony Orlando (daughter is RN)    MD: pt referred by Dr. Interiano for consideration of mCPC prior to tube surgery OS -- discussed possible options including Rhopressa, GATT/KDB, XEN, mCPC, Tube     MD: Patient never tried Cosopt or AGN given a reported H/O allergy from 10+ years ago -- retried (8/18) without any SE or issues per patient     MD:will use tutoplast sclera patch graft for left eye       Patient will continue on Latanoprost which is a teal top drop at bedtime in the RIGHT EYE ONLY, Cosopt (Timolol/Dorzolamide) which is a blue top drop 2x/day (12 hours apart) in the RIGHT EYE ONLY, Alphagan (Brimonidine) which is a purple top  drop 2x/day (12 hours apart) in the RIGHT EYE ONLY.   No heavy lifting, bending, stooping, straining, rubbing the eye, or getting water in the eye.  Please wear protective eyewear over the eye at all times including glasses during the day and the protective shield at bedtime.  Patient will return to clinic in 3-4 weeks with repeat evaluation.    Use the following drops (LEFT EYE ONLY):  Antibiotic --  Ciprofloxacin: Discontinue    Steroid -- Pred Forte/Prednisolone Acetate: every 2 hours while awake for 1 week then 4x/day until seen    Please be sure to call if you have any decrease in vision, increase in pain, flashing lights, redness, or a lot of drainage.        10 days post op after tube left eye.   Doing well, vision is stable.  IOP is good.  Continue prednisolone q2 hrs   Discontinue ciprofloxacin    RTC 2 weeks.    Hannah Worthington MD  PGY-3 Ophthalmology        Attending Physician Attestation:  Complete documentation of historical and exam elements from today's encounter can be found in the full encounter summary report (not reduplicated in this progress note). I personally obtained the chief complaint(s) and history of present illness.  I confirmed and edited as necessary the review of systems, past medical/surgical history, family history, social history, and examination findings as documented by others; and I examined the patient myself. I personally reviewed the relevant tests, images, and reports as documented above. I formulated and edited as necessary the assessment and plan and discussed the findings and management plan with the patient and family.  - Radha Morales MD

## 2019-02-14 ENCOUNTER — OFFICE VISIT (OUTPATIENT)
Dept: OPHTHALMOLOGY | Facility: CLINIC | Age: 84
End: 2019-02-14
Attending: OPHTHALMOLOGY
Payer: COMMERCIAL

## 2019-02-14 DIAGNOSIS — H40.1493 PXG (PSEUDOEXFOLIATION GLAUCOMA), SEVERE STAGE: Primary | ICD-10-CM

## 2019-02-14 PROCEDURE — G0463 HOSPITAL OUTPT CLINIC VISIT: HCPCS | Mod: ZF

## 2019-02-14 ASSESSMENT — VISUAL ACUITY
OD_SC: 20/600
OS_SC+: -2
OS_PH_SC: 20/30
METHOD: SNELLEN - LINEAR
OS_SC: 20/40

## 2019-02-14 ASSESSMENT — EXTERNAL EXAM - LEFT EYE: OS_EXAM: NORMAL

## 2019-02-14 ASSESSMENT — SLIT LAMP EXAM - LIDS
COMMENTS: NORMAL
COMMENTS: NORMAL

## 2019-02-14 ASSESSMENT — CONF VISUAL FIELD
OD_SUPERIOR_TEMPORAL_RESTRICTION: 3
OS_NORMAL: 1
OD_INFERIOR_TEMPORAL_RESTRICTION: 3
OD_SUPERIOR_NASAL_RESTRICTION: 1
OD_INFERIOR_NASAL_RESTRICTION: 1

## 2019-02-14 ASSESSMENT — TONOMETRY
IOP_METHOD: APPLANATION
OS_IOP_MMHG: 28
OD_IOP_MMHG: 10

## 2019-02-14 NOTE — PATIENT INSTRUCTIONS
Patient will continue on Latanoprost which is a teal top drop at bedtime in the RIGHT EYE ONLY, Cosopt (Timolol/Dorzolamide) which is a blue top drop 2x/day (12 hours apart) in BOTH EYES, Alphagan (Brimonidine) which is a purple top drop 2x/day (12 hours apart) in the RIGHT EYE ONLY.   No heavy lifting, bending, stooping, straining, rubbing the eye, or getting water in the eye.  Please wear protective eyewear over the eye at all times including glasses during the day and the protective shield at bedtime.  Patient will return to clinic in 3-4 weeks with repeat evaluation.    Use the following drops (LEFT EYE ONLY):  Antibiotic --  Ciprofloxacin: Discontinue    Steroid -- Pred Forte/Prednisolone Acetate: 3x/day until seen    Please be sure to call if you have any decrease in vision, increase in pain, flashing lights, redness, or a lot of drainage.

## 2019-02-14 NOTE — PROGRESS NOTES
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), s/p Tube OD x2 in past with removal in 2016, ?Trab OU given exam findings --  K pachy:566/583    Tmax: 38/27 (OS:42 on 7/18)   HVF: OD:dec MD with :Inf arcuate with SN dec  (poor reliability) and OS:Fluct    CDR:0.9/0.8     HRT/OCT: OD:Mild RNFl thinning (poor tracing) and OS:Mod RNFl thinning       FHX of Glc: No     Gonio:       Intolerant to: Methazolamide -- loss weight, decreased appetite, AGN -- redness and itching (per notes from 10+ yrs ago), Cosopt -- redness and itching per old notes from 10+ years ago (likely MITZY as she is tolerating T1/2)    Asthma/COPD: Yes, on inhalers but tolerating topical BB  Steroid Use: Yes, was on topical pred     Kidney Stones:  No   Sulfa Allergy: Yes, hives     IOP targets: --  2)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 An at [facility]  3)PCIOL OU -- following with Dr. Dudley and Dr. Interiano at [facility]  4)H/O HZO OD with post-herpetic neuralgia -- on Acyclovir and Gabapentin  5)H/O Hemiretinal Vein Occlusion OD per old notes  6)H/O K Ulcer OD per old notes -- followed by Dr. De Guzman    MD:Drew Orlando (son-in-law) who is clergy accompanies to most appointments -- Jony Orlando (daughter is RN)    MD: pt referred by Dr. Interiano for consideration of mCPC prior to tube surgery OS -- discussed possible options including Rhopressa, GATT/KDB, XEN, mCPC, Tube     MD: Patient never tried Cosopt or AGN given a reported H/O allergy from 10+ years ago -- retried (8/18) without any SE or issues per patient     MD:will use tutoplast sclera patch graft for left eye       Patient will continue on Latanoprost which is a teal top drop at bedtime in the RIGHT EYE ONLY, Cosopt (Timolol/Dorzolamide) which is a blue top drop 2x/day (12 hours apart) in BOTH EYES, Alphagan (Brimonidine) which is a purple top drop 2x/day (12 hours apart) in the RIGHT EYE ONLY.    No heavy lifting, bending, stooping, straining, rubbing the eye, or getting water in the eye.  Please wear protective eyewear over the eye at all times including glasses during the day and the protective shield at bedtime.  Patient will return to clinic in 3-4 weeks with repeat evaluation.    Use the following drops (LEFT EYE ONLY):  Antibiotic --  Ciprofloxacin: Discontinue    Steroid -- Pred Forte/Prednisolone Acetate: 3x/day until seen    Please be sure to call if you have any decrease in vision, increase in pain, flashing lights, redness, or a lot of drainage.          Attending Physician Attestation:  Complete documentation of historical and exam elements from today's encounter can be found in the full encounter summary report (not reduplicated in this progress note). I personally obtained the chief complaint(s) and history of present illness.  I confirmed and edited as necessary the review of systems, past medical/surgical history, family history, social history, and examination findings as documented by others; and I examined the patient myself. I personally reviewed the relevant tests, images, and reports as documented above. I formulated and edited as necessary the assessment and plan and discussed the findings and management plan with the patient and family.  - Radha Morales MD

## 2019-02-14 NOTE — NURSING NOTE
Chief Complaints and History of Present Illnesses   Patient presents with     Glaucoma Follow-Up     Chief Complaint(s) and History of Present Illness(es)     Glaucoma Follow-Up     Laterality: both eyes              Comments     Pt. States that she is doing well.  No change in VA BE.  No pain BE.  Bridget Cruz COT 12:56 PM February 14, 2019

## 2019-03-07 ENCOUNTER — OFFICE VISIT (OUTPATIENT)
Dept: OPHTHALMOLOGY | Facility: CLINIC | Age: 84
End: 2019-03-07
Attending: OPHTHALMOLOGY
Payer: COMMERCIAL

## 2019-03-07 DIAGNOSIS — H40.1493 PXG (PSEUDOEXFOLIATION GLAUCOMA), SEVERE STAGE: Primary | ICD-10-CM

## 2019-03-07 DIAGNOSIS — Z96.1 PSEUDOPHAKIA OF BOTH EYES: ICD-10-CM

## 2019-03-07 PROBLEM — Q44.1 GALLBLADDER ANOMALY: Status: ACTIVE | Noted: 2017-07-17

## 2019-03-07 PROCEDURE — G0463 HOSPITAL OUTPT CLINIC VISIT: HCPCS | Mod: ZF

## 2019-03-07 RX ORDER — PREDNISOLONE ACETATE 10 MG/ML
1 SUSPENSION/ DROPS OPHTHALMIC 4 TIMES DAILY
COMMUNITY

## 2019-03-07 ASSESSMENT — TONOMETRY
OD_IOP_MMHG: 24
OD_IOP_MMHG: 20
OS_IOP_MMHG: 24
IOP_METHOD: APPLANATION
IOP_METHOD: APPLANATION
OS_IOP_MMHG: 22
IOP_METHOD: TONOPEN
OS_IOP_MMHG: 24
OD_IOP_MMHG: 22

## 2019-03-07 ASSESSMENT — VISUAL ACUITY
OD_SC: 20/250
CORRECTION_TYPE: GLASSES
OD_SC+: -1
OS_SC: 20/30+
METHOD: SNELLEN - LINEAR

## 2019-03-07 ASSESSMENT — CONF VISUAL FIELD
OD_INFERIOR_TEMPORAL_RESTRICTION: 3
OD_SUPERIOR_TEMPORAL_RESTRICTION: 3
OD_INFERIOR_NASAL_RESTRICTION: 1
OD_SUPERIOR_NASAL_RESTRICTION: 1

## 2019-03-07 ASSESSMENT — SLIT LAMP EXAM - LIDS
COMMENTS: NORMAL
COMMENTS: NORMAL

## 2019-03-07 ASSESSMENT — CUP TO DISC RATIO
OS_RATIO: 0.8
OD_RATIO: 0.9

## 2019-03-07 ASSESSMENT — EXTERNAL EXAM - LEFT EYE: OS_EXAM: NORMAL

## 2019-03-07 NOTE — PROGRESS NOTES
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), s/p Tube OD x2 in past with removal in 2016, ?Trab OU given exam findings --  K pachy:566/583    Tmax: 38/27 (OS:42 on 7/18)   HVF: OD:dec MD with :Inf arcuate with SN dec  (poor reliability) and OS:Fluct    CDR:0.9/0.8     HRT/OCT: OD:Mild RNFl thinning (poor tracing) and OS:Mod RNFl thinning       FHX of Glc: No     Gonio:       Intolerant to: Methazolamide -- loss weight, decreased appetite, AGN -- redness and itching (per notes from 10+ yrs ago), Cosopt -- redness and itching per old notes from 10+ years ago (likely MITZY as she is tolerating T1/2)    Asthma/COPD: Yes, on inhalers but tolerating topical BB  Steroid Use: Yes, was on topical pred     Kidney Stones:  No   Sulfa Allergy: Yes, hives     IOP targets: --  2)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 An at [facility]  3)PCIOL OU -- following with Dr. Dudley and Dr. Interiano at [facility]  4)H/O HZO OD with post-herpetic neuralgia -- on Acyclovir and Gabapentin  5)H/O Hemiretinal Vein Occlusion OD per old notes  6)H/O K Ulcer OD per old notes -- followed by Dr. De Guzman    MD:Drew Orlando (son-in-law) who is clergy accompanies to most appointments -- Jony Orlando (daughter is RN)    MD: pt referred by Dr. Interiano for consideration of mCPC prior to tube surgery OS -- discussed possible options including Rhopressa, GATT/KDB, XEN, mCPC, Tube     MD: Patient never tried Cosopt or AGN given a reported H/O allergy from 10+ years ago -- retried (8/18) without any SE or issues per patient     MD:will use tutoplast sclera patch graft for left eye     Patient will continue on Latanoprost which is a teal top drop at bedtime in the RIGHT EYE ONLY, Cosopt (Timolol/Dorzolamide) which is a blue top drop 2x/day (12 hours apart) in BOTH EYES, Alphagan (Brimonidine) which is a purple top drop 2x/day (12 hours apart) in the RIGHT EYE ONLY.    No heavy lifting, bending, stooping, straining, rubbing the eye, or getting water in the eye.  Please wear protective eyewear over the eye at all times including glasses during the day and the protective shield at bedtime.  Patient will return to clinic in 1-2 months with visual field test (OD:LVC and OS:24-2), dilated eye exam, disc photos and repeat evaluation.    Use the following drops (LEFT EYE ONLY):  Antibiotic --  Ciprofloxacin: Discontinue    Steroid -- Pred Forte/Prednisolone Acetate: 2x/day for 2 weeks then 1x/day for 2 weeks then discontinue     Please be sure to call if you have any decrease in vision, increase in pain, flashing lights, redness, or a lot of drainage.      Resident Note  PXG   POW#7 S/p Ahmed left eye 1/14/19   Drops: cosopt twice a day both eyes, alphagan twice a day right eye, xalatan at bedtime right eye, pred three times a day  left eye     IOP 20/22 today  Tube in place, well covered, healing well  Continue to taper Pred twice a day x2 weeks then every day x2 weeks then stop   Monitor for now, could start xalatan left eye if intraocular pressure persistently elevated     return to clinic: 4 weeks IOP check       Philip Verdin MD  Ophthalmology Resident, PGY-3  [facility]            Attending Physician Attestation:  Complete documentation of historical and exam elements from today's encounter can be found in the full encounter summary report (not reduplicated in this progress note). I personally obtained the chief complaint(s) and history of present illness.  I confirmed and edited as necessary the review of systems, past medical/surgical history, family history, social history, and examination findings as documented by others; and I examined the patient myself. I personally reviewed the relevant tests, images, and reports as documented above. I formulated and edited as necessary the assessment and plan and discussed the findings and management plan with the  patient and family.  - Radha Morales MD

## 2019-03-07 NOTE — NURSING NOTE
Chief Complaints and History of Present Illnesses   Patient presents with     Post Op (Ophthalmology) Left Eye     Chief Complaint(s) and History of Present Illness(es)     Post Op (Ophthalmology) Left Eye     Laterality: left eye    Course: stable    Associated symptoms: Negative for dryness, eye pain, redness, tearing, glare and haloes    Pain scale: 4/10 (RE as pt states because of history of shingles.    Denies pain in LE  Susannah Montelongo COT 12:55 PM 03/07/2019  )              Comments     s/p Tube OS (1/14/19)  Latanoprost at bedtime RE / LD @ 9 pm last cayden  Cosopt which  2x/day BE / LD 7 am today  Alphagan 2x/day RE / LD 7:05 am today  Prednisolone 4 times a day to LE.    Susannah Montelongo COT 12:59 PM 03/07/2019

## 2019-03-07 NOTE — PATIENT INSTRUCTIONS
Patient will continue on Latanoprost which is a teal top drop at bedtime in the RIGHT EYE ONLY, Cosopt (Timolol/Dorzolamide) which is a blue top drop 2x/day (12 hours apart) in BOTH EYES, Alphagan (Brimonidine) which is a purple top drop 2x/day (12 hours apart) in the RIGHT EYE ONLY.   No heavy lifting, bending, stooping, straining, rubbing the eye, or getting water in the eye.  Please wear protective eyewear over the eye at all times including glasses during the day and the protective shield at bedtime.  Patient will return to clinic in 1-2 months with visual field test (OD:LVC and OS:24-2), dilated eye exam, disc photos and repeat evaluation.    Use the following drops (LEFT EYE ONLY):  Antibiotic --  Ciprofloxacin: Discontinue    Steroid -- Pred Forte/Prednisolone Acetate: 2x/day for 2 weeks then 1x/day for 2 weeks then discontinue     Please be sure to call if you have any decrease in vision, increase in pain, flashing lights, redness, or a lot of drainage.

## 2019-05-23 ENCOUNTER — OFFICE VISIT (OUTPATIENT)
Dept: OPHTHALMOLOGY | Facility: CLINIC | Age: 84
End: 2019-05-23
Attending: OPHTHALMOLOGY
Payer: COMMERCIAL

## 2019-05-23 DIAGNOSIS — Z96.1 PSEUDOPHAKIA OF BOTH EYES: ICD-10-CM

## 2019-05-23 DIAGNOSIS — H40.1493 PXG (PSEUDOEXFOLIATION GLAUCOMA), SEVERE STAGE: Primary | ICD-10-CM

## 2019-05-23 PROCEDURE — 92250 FUNDUS PHOTOGRAPHY W/I&R: CPT | Mod: ZF | Performed by: OPHTHALMOLOGY

## 2019-05-23 PROCEDURE — 92083 EXTENDED VISUAL FIELD XM: CPT | Mod: ZF | Performed by: OPHTHALMOLOGY

## 2019-05-23 PROCEDURE — G0463 HOSPITAL OUTPT CLINIC VISIT: HCPCS | Mod: ZF

## 2019-05-23 ASSESSMENT — REFRACTION_WEARINGRX
OS_CYLINDER: +0.25
OS_SPHERE: -0.25
OS_AXIS: 010
OD_SPHERE: BALANCE

## 2019-05-23 ASSESSMENT — CONF VISUAL FIELD
OD_INFERIOR_NASAL_RESTRICTION: 1
OD_INFERIOR_TEMPORAL_RESTRICTION: 3
OS_NORMAL: 1
OD_SUPERIOR_NASAL_RESTRICTION: 1
OD_SUPERIOR_TEMPORAL_RESTRICTION: 3
METHOD: COUNTING FINGERS

## 2019-05-23 ASSESSMENT — TONOMETRY
OS_IOP_MMHG: 17
OD_IOP_MMHG: 16
IOP_METHOD: APPLANATION

## 2019-05-23 ASSESSMENT — CUP TO DISC RATIO
OD_RATIO: 0.9
OS_RATIO: 0.8

## 2019-05-23 ASSESSMENT — VISUAL ACUITY
OD_SC: 20/250
METHOD: SNELLEN - LINEAR
OS_SC: 20/40

## 2019-05-23 ASSESSMENT — SLIT LAMP EXAM - LIDS
COMMENTS: NORMAL
COMMENTS: NORMAL

## 2019-05-23 ASSESSMENT — EXTERNAL EXAM - LEFT EYE: OS_EXAM: NORMAL

## 2019-05-23 NOTE — NURSING NOTE
"Chief Complaints and History of Present Illnesses   Patient presents with     Glaucoma Follow-Up     2 month follow up PXG     Chief Complaint(s) and History of Present Illness(es)     Glaucoma Follow-Up     Comments: 2 month follow up PXG              Comments     Pt states vision is about the same as last visit. Pt having \"shingles\" pain in RE and states that she has had constant eye pain in her RE.  No flashes.     Rodney LARSON May 23, 2019 1:08 PM                  "

## 2019-05-23 NOTE — PATIENT INSTRUCTIONS
Patient will continue on Latanoprost which is a teal top drop at bedtime in the RIGHT EYE ONLY, Cosopt (Timolol/Dorzolamide) which is a blue top drop 2x/day (12 hours apart) in BOTH EYES, Alphagan (Brimonidine) which is a purple top drop 2x/day (12 hours apart) in the RIGHT EYE ONLY.   No heavy lifting, bending, stooping, straining, rubbing the eye, or getting water in the eye.  Please wear protective eyewear over the eye at all times including glasses during the day and the protective shield at bedtime.  Patient will return to clinic in 3-4 months with visual field test (OD:LVC and OS:24-2) and repeat evaluation.    Use the following drops (LEFT EYE ONLY):  Antibiotic --  Ciprofloxacin: Discontinue    Steroid -- Pred Forte/Prednisolone Acetate: Discontinue     Please be sure to call if you have any decrease in vision, increase in pain, flashing lights, redness, or a lot of drainage.

## 2019-05-23 NOTE — PROGRESS NOTES
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), s/p Tube OD x2 in past with removal in 2016, ?Trab OU given exam findings --  K pachy:566/583    Tmax: 38/27 (OS:42 on 7/18)   HVF: OD:dec MD with :Inf arcuate with SN dec  (poor reliability) and OS:Fluct    CDR:0.9/0.8     HRT/OCT: OD:Mild RNFl thinning (poor tracing) and OS:Mod RNFl thinning       FHX of Glc: No     Gonio:       Intolerant to: Methazolamide -- loss weight, decreased appetite, AGN -- redness and itching (per notes from 10+ yrs ago), Cosopt -- redness and itching per old notes from 10+ years ago (likely MITZY as she is tolerating T1/2)    Asthma/COPD: Yes, on inhalers but tolerating topical BB  Steroid Use: Yes, was on topical pred     Kidney Stones:  No   Sulfa Allergy: Yes, hives     IOP targets: --  2)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 An at [facility]  3)PCIOL OU -- following with Dr. Dudley and Dr. Interiano at [facility]  4)H/O HZO OD with post-herpetic neuralgia -- on Acyclovir and Gabapentin  5)H/O Hemiretinal Vein Occlusion OD per old notes  6)H/O K Ulcer OD per old notes -- followed by Dr. De Guzman    MD:Drew Orlando (son-in-law) who is clergy accompanies to most appointments -- Jony Orlando (daughter is RN)    MD: pt referred by Dr. Interiano for consideration of mCPC prior to tube surgery OS -- discussed possible options including Rhopressa, GATT/KDB, XEN, mCPC, Tube     MD: Patient never tried Cosopt or AGN given a reported H/O allergy from 10+ years ago -- retried (8/18) without any SE or issues per patient     MD:will use tutoplast sclera patch graft for left eye     Patient will continue on Latanoprost which is a teal top drop at bedtime in the RIGHT EYE ONLY, Cosopt (Timolol/Dorzolamide) which is a blue top drop 2x/day (12 hours apart) in BOTH EYES, Alphagan (Brimonidine) which is a purple top drop 2x/day (12 hours apart) in the RIGHT EYE ONLY.    No heavy lifting, bending, stooping, straining, rubbing the eye, or getting water in the eye.  Please wear protective eyewear over the eye at all times including glasses during the day and the protective shield at bedtime.  Patient will return to clinic in 3-4 months with visual field test (OD:LVC and OS:24-2) and repeat evaluation.    Use the following drops (LEFT EYE ONLY):  Antibiotic --  Ciprofloxacin: Discontinue    Steroid -- Pred Forte/Prednisolone Acetate: Discontinue     Please be sure to call if you have any decrease in vision, increase in pain, flashing lights, redness, or a lot of drainage.        Resident Note  PXG   S/p Ahmed left eye 1/14/19   Drops: cosopt twice a day both eyes, alphagan twice a day right eye, xalatan at bedtime right eye    IOP 16/17 today  Tube in place, well covered, healing well  Visual fields today - ?cloverleaf effect right eye, stable compared to previous studies  Monitor for now, could start xalatan left eye if intraocular pressure persistently elevated     return to clinic: patient considering transferring care to [facility] for convince and insurance reasons    Derek Schwarz M.D.  PGY-3, Ophthalmology       Attending Physician Attestation:  Complete documentation of historical and exam elements from today's encounter can be found in the full encounter summary report (not reduplicated in this progress note). I personally obtained the chief complaint(s) and history of present illness.  I confirmed and edited as necessary the review of systems, past medical/surgical history, family history, social history, and examination findings as documented by others; and I examined the patient myself. I personally reviewed the relevant tests, images, and reports as documented above. I formulated and edited as necessary the assessment and plan and discussed the findings and management plan with the patient and family.  - Radha Morales MD

## 2020-03-10 ENCOUNTER — HEALTH MAINTENANCE LETTER (OUTPATIENT)
Age: 85
End: 2020-03-10

## 2020-12-27 ENCOUNTER — HEALTH MAINTENANCE LETTER (OUTPATIENT)
Age: 85
End: 2020-12-27

## 2021-04-24 ENCOUNTER — HEALTH MAINTENANCE LETTER (OUTPATIENT)
Age: 86
End: 2021-04-24

## 2021-10-09 ENCOUNTER — HEALTH MAINTENANCE LETTER (OUTPATIENT)
Age: 86
End: 2021-10-09

## 2022-05-16 ENCOUNTER — HEALTH MAINTENANCE LETTER (OUTPATIENT)
Age: 87
End: 2022-05-16

## 2022-09-11 ENCOUNTER — HEALTH MAINTENANCE LETTER (OUTPATIENT)
Age: 87
End: 2022-09-11

## 2023-06-03 ENCOUNTER — HEALTH MAINTENANCE LETTER (OUTPATIENT)
Age: 88
End: 2023-06-03